# Patient Record
Sex: FEMALE | Race: WHITE | NOT HISPANIC OR LATINO | Employment: FULL TIME | URBAN - METROPOLITAN AREA
[De-identification: names, ages, dates, MRNs, and addresses within clinical notes are randomized per-mention and may not be internally consistent; named-entity substitution may affect disease eponyms.]

---

## 2019-07-03 ENCOUNTER — NON-PROVIDER VISIT (OUTPATIENT)
Dept: URGENT CARE | Facility: CLINIC | Age: 24
End: 2019-07-03

## 2019-07-03 DIAGNOSIS — Z02.1 PRE-EMPLOYMENT DRUG SCREENING: ICD-10-CM

## 2019-07-03 LAB
AMP AMPHETAMINE: NORMAL
BAR BARBITURATES: NORMAL
BZO BENZODIAZEPINES: NORMAL
COC COCAINE: NORMAL
INT CON NEG: NORMAL
INT CON POS: NORMAL
MDMA ECSTASY: NORMAL
MET METHAMPHETAMINES: NORMAL
MTD METHADONE: NORMAL
OPI OPIATES: NORMAL
OXY OXYCODONE: NORMAL
PCP PHENCYCLIDINE: NORMAL
POC URINE DRUG SCREEN OCDRS: NEGATIVE
THC: NORMAL

## 2019-07-03 PROCEDURE — 80305 DRUG TEST PRSMV DIR OPT OBS: CPT | Performed by: PHYSICIAN ASSISTANT

## 2021-08-07 ENCOUNTER — HOSPITAL ENCOUNTER (EMERGENCY)
Facility: MEDICAL CENTER | Age: 26
End: 2021-08-08
Attending: EMERGENCY MEDICINE
Payer: COMMERCIAL

## 2021-08-07 VITALS
WEIGHT: 193.34 LBS | TEMPERATURE: 98.3 F | HEIGHT: 67 IN | OXYGEN SATURATION: 95 % | DIASTOLIC BLOOD PRESSURE: 74 MMHG | HEART RATE: 82 BPM | BODY MASS INDEX: 30.35 KG/M2 | RESPIRATION RATE: 18 BRPM | SYSTOLIC BLOOD PRESSURE: 132 MMHG

## 2021-08-07 DIAGNOSIS — S16.1XXA STRAIN OF NECK MUSCLE, INITIAL ENCOUNTER: ICD-10-CM
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
## 2021-08-07 PROCEDURE — 96372 THER/PROPH/DIAG INJ SC/IM: CPT

## 2021-08-07 PROCEDURE — 700111 HCHG RX REV CODE 636 W/ 250 OVERRIDE (IP): Performed by: EMERGENCY MEDICINE

## 2021-08-07 PROCEDURE — 99285 EMERGENCY DEPT VISIT HI MDM: CPT

## 2021-08-07 RX ORDER — LISINOPRIL 40 MG/1
40 TABLET ORAL DAILY
COMMUNITY

## 2021-08-07 RX ORDER — TRAMADOL HYDROCHLORIDE 50 MG/1
50 TABLET ORAL DAILY
COMMUNITY

## 2021-08-07 RX ORDER — HYDROMORPHONE HYDROCHLORIDE 1 MG/ML
1 INJECTION, SOLUTION INTRAMUSCULAR; INTRAVENOUS; SUBCUTANEOUS ONCE
Status: COMPLETED | OUTPATIENT
Start: 2021-08-08 | End: 2021-08-07

## 2021-08-07 RX ORDER — GABAPENTIN 300 MG/1
300 CAPSULE ORAL DAILY
COMMUNITY

## 2021-08-07 RX ORDER — BUPROPION HYDROCHLORIDE 100 MG/1
100 TABLET ORAL DAILY
Status: SHIPPED | COMMUNITY
End: 2023-06-20

## 2021-08-07 RX ADMIN — HYDROMORPHONE HYDROCHLORIDE 1 MG: 1 INJECTION, SOLUTION INTRAMUSCULAR; INTRAVENOUS; SUBCUTANEOUS at 23:36

## 2021-08-07 NOTE — Clinical Note
Alexia Brown was seen and treated in our emergency department on 8/7/2021.  She may return to work on 08/10/2021.       If you have any questions or concerns, please don't hesitate to call.      Volodymyr Arteaga M.D.

## 2021-08-08 ENCOUNTER — APPOINTMENT (OUTPATIENT)
Dept: RADIOLOGY | Facility: MEDICAL CENTER | Age: 26
End: 2021-08-08
Attending: EMERGENCY MEDICINE
Payer: COMMERCIAL

## 2021-08-08 PROCEDURE — 700111 HCHG RX REV CODE 636 W/ 250 OVERRIDE (IP): Performed by: EMERGENCY MEDICINE

## 2021-08-08 PROCEDURE — 72125 CT NECK SPINE W/O DYE: CPT

## 2021-08-08 PROCEDURE — A9270 NON-COVERED ITEM OR SERVICE: HCPCS | Performed by: EMERGENCY MEDICINE

## 2021-08-08 PROCEDURE — 700102 HCHG RX REV CODE 250 W/ 637 OVERRIDE(OP): Performed by: EMERGENCY MEDICINE

## 2021-08-08 PROCEDURE — 71045 X-RAY EXAM CHEST 1 VIEW: CPT

## 2021-08-08 RX ORDER — IBUPROFEN 600 MG/1
600 TABLET ORAL ONCE
Status: COMPLETED | OUTPATIENT
Start: 2021-08-08 | End: 2021-08-08

## 2021-08-08 RX ORDER — ONDANSETRON 4 MG/1
4 TABLET, ORALLY DISINTEGRATING ORAL ONCE
Status: COMPLETED | OUTPATIENT
Start: 2021-08-08 | End: 2021-08-08

## 2021-08-08 RX ADMIN — IBUPROFEN 600 MG: 600 TABLET ORAL at 03:27

## 2021-08-08 RX ADMIN — ONDANSETRON 4 MG: 4 TABLET, ORALLY DISINTEGRATING ORAL at 00:33
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
## 2023-05-21 ENCOUNTER — APPOINTMENT (OUTPATIENT)
Dept: RADIOLOGY | Facility: MEDICAL CENTER | Age: 28
End: 2023-05-21
Attending: EMERGENCY MEDICINE
Payer: COMMERCIAL

## 2023-05-21 ENCOUNTER — HOSPITAL ENCOUNTER (EMERGENCY)
Facility: MEDICAL CENTER | Age: 28
End: 2023-05-21
Attending: EMERGENCY MEDICINE
Payer: COMMERCIAL

## 2023-05-21 VITALS
SYSTOLIC BLOOD PRESSURE: 137 MMHG | TEMPERATURE: 98.2 F | DIASTOLIC BLOOD PRESSURE: 71 MMHG | WEIGHT: 207.89 LBS | BODY MASS INDEX: 32.56 KG/M2 | OXYGEN SATURATION: 95 % | HEART RATE: 72 BPM | RESPIRATION RATE: 18 BRPM

## 2023-05-21 DIAGNOSIS — K62.5 RECTAL BLEEDING: ICD-10-CM

## 2023-05-21 LAB
ABO GROUP BLD: NORMAL
ALBUMIN SERPL BCP-MCNC: 3.9 G/DL (ref 3.2–4.9)
ALBUMIN/GLOB SERPL: 1.4 G/DL
ALP SERPL-CCNC: 191 U/L (ref 30–99)
ALT SERPL-CCNC: 79 U/L (ref 2–50)
ANION GAP SERPL CALC-SCNC: 11 MMOL/L (ref 7–16)
APTT PPP: 27.9 SEC (ref 24.7–36)
AST SERPL-CCNC: 31 U/L (ref 12–45)
BASOPHILS # BLD AUTO: 0.7 % (ref 0–1.8)
BASOPHILS # BLD: 0.05 K/UL (ref 0–0.12)
BILIRUB SERPL-MCNC: 0.2 MG/DL (ref 0.1–1.5)
BLD GP AB SCN SERPL QL: NORMAL
BUN SERPL-MCNC: 4 MG/DL (ref 8–22)
CALCIUM ALBUM COR SERPL-MCNC: 9 MG/DL (ref 8.5–10.5)
CALCIUM SERPL-MCNC: 8.9 MG/DL (ref 8.5–10.5)
CHLORIDE SERPL-SCNC: 106 MMOL/L (ref 96–112)
CO2 SERPL-SCNC: 23 MMOL/L (ref 20–33)
CREAT SERPL-MCNC: 0.6 MG/DL (ref 0.5–1.4)
EOSINOPHIL # BLD AUTO: 0.07 K/UL (ref 0–0.51)
EOSINOPHIL NFR BLD: 1 % (ref 0–6.9)
ERYTHROCYTE [DISTWIDTH] IN BLOOD BY AUTOMATED COUNT: 43.3 FL (ref 35.9–50)
GFR SERPLBLD CREATININE-BSD FMLA CKD-EPI: 125 ML/MIN/1.73 M 2
GLOBULIN SER CALC-MCNC: 2.7 G/DL (ref 1.9–3.5)
GLUCOSE SERPL-MCNC: 116 MG/DL (ref 65–99)
HCG SERPL QL: NEGATIVE
HCT VFR BLD AUTO: 42.9 % (ref 37–47)
HGB BLD-MCNC: 14 G/DL (ref 12–16)
IMM GRANULOCYTES # BLD AUTO: 0.02 K/UL (ref 0–0.11)
IMM GRANULOCYTES NFR BLD AUTO: 0.3 % (ref 0–0.9)
INR PPP: 0.97 (ref 0.87–1.13)
LIPASE SERPL-CCNC: 36 U/L (ref 11–82)
LYMPHOCYTES # BLD AUTO: 2.09 K/UL (ref 1–4.8)
LYMPHOCYTES NFR BLD: 30.6 % (ref 22–41)
MCH RBC QN AUTO: 30.2 PG (ref 27–33)
MCHC RBC AUTO-ENTMCNC: 32.6 G/DL (ref 33.6–35)
MCV RBC AUTO: 92.5 FL (ref 81.4–97.8)
MONOCYTES # BLD AUTO: 0.45 K/UL (ref 0–0.85)
MONOCYTES NFR BLD AUTO: 6.6 % (ref 0–13.4)
NEUTROPHILS # BLD AUTO: 4.14 K/UL (ref 2–7.15)
NEUTROPHILS NFR BLD: 60.8 % (ref 44–72)
NRBC # BLD AUTO: 0 K/UL
NRBC BLD-RTO: 0 /100 WBC
PLATELET # BLD AUTO: 380 K/UL (ref 164–446)
PMV BLD AUTO: 10.2 FL (ref 9–12.9)
POTASSIUM SERPL-SCNC: 3.6 MMOL/L (ref 3.6–5.5)
PROT SERPL-MCNC: 6.6 G/DL (ref 6–8.2)
PROTHROMBIN TIME: 12.8 SEC (ref 12–14.6)
RBC # BLD AUTO: 4.64 M/UL (ref 4.2–5.4)
RH BLD: NORMAL
SODIUM SERPL-SCNC: 140 MMOL/L (ref 135–145)
WBC # BLD AUTO: 6.8 K/UL (ref 4.8–10.8)

## 2023-05-21 PROCEDURE — 83690 ASSAY OF LIPASE: CPT

## 2023-05-21 PROCEDURE — 86850 RBC ANTIBODY SCREEN: CPT

## 2023-05-21 PROCEDURE — 99283 EMERGENCY DEPT VISIT LOW MDM: CPT

## 2023-05-21 PROCEDURE — 86901 BLOOD TYPING SEROLOGIC RH(D): CPT

## 2023-05-21 PROCEDURE — 71045 X-RAY EXAM CHEST 1 VIEW: CPT

## 2023-05-21 PROCEDURE — 85730 THROMBOPLASTIN TIME PARTIAL: CPT

## 2023-05-21 PROCEDURE — 80053 COMPREHEN METABOLIC PANEL: CPT

## 2023-05-21 PROCEDURE — 85025 COMPLETE CBC W/AUTO DIFF WBC: CPT

## 2023-05-21 PROCEDURE — 36415 COLL VENOUS BLD VENIPUNCTURE: CPT

## 2023-05-21 PROCEDURE — 86900 BLOOD TYPING SEROLOGIC ABO: CPT

## 2023-05-21 PROCEDURE — 84703 CHORIONIC GONADOTROPIN ASSAY: CPT

## 2023-05-21 PROCEDURE — 85610 PROTHROMBIN TIME: CPT

## 2023-05-21 ASSESSMENT — FIBROSIS 4 INDEX: FIB4 SCORE: 0.27

## 2023-05-21 ASSESSMENT — PAIN DESCRIPTION - PAIN TYPE: TYPE: ACUTE PAIN

## 2023-05-21 NOTE — Clinical Note
Alexia Brown was seen and treated in our emergency department on 5/21/2023.  She may return to work on 05/24/2023.       If you have any questions or concerns, please don't hesitate to call.      Justice Baeza D.O.

## 2023-05-21 NOTE — ED NOTES
Taken patient from triage waiting room, ambulatory with steady gait, alert/ oriented x 4.Verified patient identification.  Assumed patient care. Placed on patient room. Changed clothes to hospital gown. Connected to cardiac monitor. Given the call light and instructed to call for any assistance needed/ or concerns. Bed on lowest position, side rails up, breaks locked. Will continue to monitor for any complications     Chief Complaint   Patient presents with    Bloody Stools

## 2023-05-21 NOTE — ED TRIAGE NOTES
"Chief Complaint   Patient presents with    Bloody Stools     \"Cant stop shitting blood.\"  Pt reports recent UTI and COVID.\"  Pt reports that she was seen at [facility] recently for same.   /84   Pulse 71   Temp 36.8 °C (98.2 °F) (Temporal)   Resp 18   Wt 94.3 kg (207 lb 14.3 oz)   SpO2 96%   Pt informed of wait times. Educated on triage process.  Asked to return to triage RN for any new or worsening of symptoms. Thanked for patience.      "

## 2023-05-22 NOTE — ED NOTES
Pt discharged to home. Discharge paperwork provided. Education provided by ERP.  Pt was given follow up instructions   Pt verbalized understanding of all instructions for discharge. Patient ambulatory, alert and oriented x 4, out of ER with all belongings and steady gait.

## 2023-06-20 ENCOUNTER — OFFICE VISIT (OUTPATIENT)
Dept: MEDICAL GROUP | Facility: PHYSICIAN GROUP | Age: 28
End: 2023-06-20
Payer: COMMERCIAL

## 2023-06-20 VITALS
DIASTOLIC BLOOD PRESSURE: 74 MMHG | HEIGHT: 66 IN | SYSTOLIC BLOOD PRESSURE: 138 MMHG | HEART RATE: 74 BPM | BODY MASS INDEX: 33.87 KG/M2 | TEMPERATURE: 97.1 F | OXYGEN SATURATION: 94 % | RESPIRATION RATE: 12 BRPM | WEIGHT: 210.76 LBS

## 2023-06-20 DIAGNOSIS — E66.09 CLASS 1 OBESITY DUE TO EXCESS CALORIES WITH SERIOUS COMORBIDITY AND BODY MASS INDEX (BMI) OF 34.0 TO 34.9 IN ADULT: ICD-10-CM

## 2023-06-20 DIAGNOSIS — R74.8 ELEVATED LIVER ENZYMES: ICD-10-CM

## 2023-06-20 DIAGNOSIS — K62.5 RECTAL BLEEDING: ICD-10-CM

## 2023-06-20 DIAGNOSIS — Z86.39 HX OF THYROID NODULE: ICD-10-CM

## 2023-06-20 DIAGNOSIS — R06.83 LOUD SNORING: ICD-10-CM

## 2023-06-20 DIAGNOSIS — E66.9 OBESITY (BMI 30-39.9): ICD-10-CM

## 2023-06-20 DIAGNOSIS — M54.2 NECK PAIN: ICD-10-CM

## 2023-06-20 DIAGNOSIS — I10 PRIMARY HYPERTENSION: ICD-10-CM

## 2023-06-20 PROCEDURE — 3075F SYST BP GE 130 - 139MM HG: CPT | Performed by: STUDENT IN AN ORGANIZED HEALTH CARE EDUCATION/TRAINING PROGRAM

## 2023-06-20 PROCEDURE — 99203 OFFICE O/P NEW LOW 30 MIN: CPT | Performed by: STUDENT IN AN ORGANIZED HEALTH CARE EDUCATION/TRAINING PROGRAM

## 2023-06-20 PROCEDURE — 3078F DIAST BP <80 MM HG: CPT | Performed by: STUDENT IN AN ORGANIZED HEALTH CARE EDUCATION/TRAINING PROGRAM

## 2023-06-20 RX ORDER — BUPROPION HYDROCHLORIDE 150 MG/1
TABLET, EXTENDED RELEASE ORAL
COMMUNITY
Start: 2022-12-23

## 2023-06-20 ASSESSMENT — FIBROSIS 4 INDEX: FIB4 SCORE: 0.25

## 2023-06-20 ASSESSMENT — ENCOUNTER SYMPTOMS
SHORTNESS OF BREATH: 0
HEADACHES: 0
COUGH: 0
ORTHOPNEA: 0
PALPITATIONS: 0
FOCAL WEAKNESS: 0
NAUSEA: 0
BLOOD IN STOOL: 1
CHILLS: 0
WHEEZING: 0
ABDOMINAL PAIN: 1
DIZZINESS: 0
FEVER: 0

## 2023-06-20 ASSESSMENT — PATIENT HEALTH QUESTIONNAIRE - PHQ9: CLINICAL INTERPRETATION OF PHQ2 SCORE: 0

## 2023-06-20 NOTE — ASSESSMENT & PLAN NOTE
History of high blood pressure, currently on lisinopril 40 mg daily.  Reports her average blood pressures at home are around 140 systolic.  But she believes it is due to the recent COVID and other medical issues she is currently having.  Advised her to continue lisinopril 40 mg daily, check blood pressures daily, continue to lose weight exercise and diet.  Return to care 3 months for recheck

## 2023-06-20 NOTE — ASSESSMENT & PLAN NOTE
Significantly elevated liver enzymes last month, I suspect this was from her COVID infection.  We will recheck these values

## 2023-06-20 NOTE — LETTER
[employer] [facility]  Keily Pablo D.O.  [address]  [address]  [facility] [address]  Fax: [phone]   Authorization for Release/Disclosure of   Protected Health Information   Name: ALEXIA BROWN : 1995 SSN: [id number]   Address: [address] [place] [id number] Phone:    [phone] (home)    I authorize the entity listed below to release/disclose the PHI below to:   [facility]/Keily Pablo D.O. and Keily Pablo D.O.   Provider or Entity Name:  [facility]   Address   City, State, Zip   Phone:      Fax:     Reason for request: continuity of care   Information to be released:    [  ] LAST COLONOSCOPY,  including any PATH REPORT and follow-up  [  ] LAST FIT/COLOGUARD RESULT [  ] LAST DEXA  [  ] LAST MAMMOGRAM  [  ] LAST PAP  [  ] LAST LABS [  ] RETINA EXAM REPORT  [  ] IMMUNIZATION RECORDS  [x  ] Release all info      [  ] Check here and initial the line next to each item to release ALL health information INCLUDING  _____ Care and treatment for drug and / or alcohol abuse  _____ HIV testing, infection status, or AIDS  _____ Genetic Testing    DATES OF SERVICE OR TIME PERIOD TO BE DISCLOSED: _____________  I understand and acknowledge that:  * This Authorization may be revoked at any time by you in writing, except if your health information has already been used or disclosed.  * Your health information that will be used or disclosed as a result of you signing this authorization could be re-disclosed by the recipient. If this occurs, your re-disclosed health information may no longer be protected by State or Federal laws.  * You may refuse to sign this Authorization. Your refusal will not affect your ability to obtain treatment.  * This Authorization becomes effective upon signing and will  on (date) __________.      If no date is indicated, this Authorization will  one (1) year from the signature date.    Name: Alexia Brown  Signature: Date:    6/20/2023     PLEASE FAX REQUESTED RECORDS BACK TO: [phone]

## 2023-06-20 NOTE — PROGRESS NOTES
Subjective:   HISTORY OF THE PRESENT ILLNESS: Patient is a 27 y.o. female here today to establish care.     Problem   Rectal Bleeding    Patient reports that she had episodes of rectal bleeding last month she went to the ER twice.  Reports that she may have hemorrhoids however she had referral placed to gastroenterology want to move forward with a colonoscopy.  She has this scheduled.  CBC from last month shows no anemia     Obesity (Bmi 30-39.9)   Obesity Due to Excess Calories With Serious Comorbidity   Elevated Liver Enzymes   Hypertension    Has htn    On lisinopril 40mg daily.   At home blood pressures are occasional high, on average 140 systolic.      Neck Pain        Current Outpatient Medications Ordered in Epic   Medication Sig Dispense Refill    buPROPion SR (WELLBUTRIN-SR) 150 MG TABLET SR 12 HR sustained-release tablet TAKE ONE TABLETS BY MOUTH EVERY MORNING      traMADol (ULTRAM) 50 MG Tab Take 50 mg by mouth every day.      lisinopril (PRINIVIL) 40 MG tablet Take 40 mg by mouth every day.      gabapentin (NEURONTIN) 300 MG Cap Take 300 mg by mouth every day.       No current [facility]-ordered facility-administered medications on file.       Review of systems:  Review of Systems   Constitutional:  Negative for chills and fever.   Respiratory:  Negative for cough, shortness of breath and wheezing.    Cardiovascular:  Negative for chest pain, palpitations, orthopnea and leg swelling.   Gastrointestinal:  Positive for abdominal pain and blood in stool. Negative for melena and nausea.   Musculoskeletal:  Negative for joint pain.   Neurological:  Negative for dizziness, focal weakness and headaches.         Past Medical History:   Diagnosis Date    Depression     Hypertension     Neck pain      Past Surgical History:   Procedure Laterality Date    CERVICAL DISK AND FUSION ANTERIOR      LAPAROSCOPY      abdomen      Social History     Tobacco Use    Smoking status: Former     Types: Cigarettes     Quit date:  "2022     Years since quittin.4    Smokeless tobacco: Never   Vaping Use    Vaping Use: Never used   Substance Use Topics    Alcohol use: Not Currently    Drug use: Yes     Types: Oral     Comment: gummie marijuana       Family History   Problem Relation Age of Onset    Breast Cancer Maternal Grandmother      Current Outpatient Medications   Medication Sig Dispense Refill    buPROPion SR (WELLBUTRIN-SR) 150 MG TABLET SR 12 HR sustained-release tablet TAKE ONE TABLETS BY MOUTH EVERY MORNING      traMADol (ULTRAM) 50 MG Tab Take 50 mg by mouth every day.      lisinopril (PRINIVIL) 40 MG tablet Take 40 mg by mouth every day.      gabapentin (NEURONTIN) 300 MG Cap Take 300 mg by mouth every day.       No current facility-administered medications for this visit.       Allergies:  No Known Allergies    Allergies, past medical history, past surgical history, family history, social history reviewed and updated.    Objective:    /74 (BP Location: Right arm, Patient Position: Sitting, BP Cuff Size: Adult)   Pulse 74   Temp 36.2 °C (97.1 °F) (Temporal)   Resp 12   Ht 1.676 m (5' 6\")   Wt 95.6 kg (210 lb 12.2 oz)   LMP 2023 (Exact Date)   SpO2 94%   BMI 34.02 kg/m²    Body mass index is 34.02 kg/m².    Physical exam:  General: Normal appearance, no acute distress, not ill-appearing  HEENT: EOM intact, conjunctiva normal limits, negative right/left eye discharge.  Sclera anicteric  Cardiovascular: Normal rate and rhythm, no murmurs  Pulmonary: No respiratory distress, no wheezing, no rales, breath sounds normal.  Abdomen: Bowel sounds normal, flat, soft.  Musculoskeletal: No edema bilaterally  Skin: Warm, dry, no lesions  Neurological: No focal deficits, normal gait  Psychiatric: Mood within normal limits    Assessment/Plan:    Patient here for a preventive medicine visit today and to establish care.  -Reviewed all past medical history, family history, social history    -Diet and exercise appropriate " counseling given  -Social determinants of health reviewed  -Tobacco, alcohol, recreational drug use: no concerns  -Occupation: beautician  -Cholesterol screening: not indicated  -Diabetes screening: ordered      Immunizations/Infectious disease:  STI screening:declines  Safe sex practices discusssed  HIV screening: declines  Immunizations: up to date    Cancer screenings:  Colorectal cancer screening: no fam hx   Cervical Cancer Screening: advised to schedule  Breast Cancer Screening:          ----BRCA SCREENING: FHS-7 score: maternal grandmother with breast cancer. Information to [employer] given      Ob-Gyn/ History:   /Para:    Gyn Surgery: laparoscopy for endometriosis.   Current Contraceptive Method: copper IUD placed 2 years, cycles are irregular.       Problem List Items Addressed This Visit       Rectal bleeding     Following up with GI, has colonoscopy scheduled         Obesity (BMI 30-39.9)    Relevant Orders    TSH+FREE T4    HEMOGLOBIN A1C    Referral to Nutrition Services    Obesity due to excess calories with serious comorbidity    Hypertension     History of high blood pressure, currently on lisinopril 40 mg daily.  Reports her average blood pressures at home are around 140 systolic.  But she believes it is due to the recent COVID and other medical issues she is currently having.  Advised her to continue lisinopril 40 mg daily, check blood pressures daily, continue to lose weight exercise and diet.  Return to care 3 months for recheck         Neck pain    Elevated liver enzymes     Significantly elevated liver enzymes last month, I suspect this was from her COVID infection.  We will recheck these values         Relevant Orders    Comp Metabolic Panel     Other Visit Diagnoses       Loud snoring        Relevant Orders    Referral to Pulmonary and Sleep Medicine    Hx of thyroid nodule        Relevant Orders    US-THYROID             Patient Counseling:  Return in about 3 months  (around 9/20/2023) for symptoms.

## 2023-07-21 ENCOUNTER — APPOINTMENT (OUTPATIENT)
Dept: RADIOLOGY | Facility: MEDICAL CENTER | Age: 28
End: 2023-07-21
Attending: STUDENT IN AN ORGANIZED HEALTH CARE EDUCATION/TRAINING PROGRAM
Payer: COMMERCIAL

## 2023-07-21 ENCOUNTER — HOSPITAL ENCOUNTER (OUTPATIENT)
Dept: LAB | Facility: MEDICAL CENTER | Age: 28
End: 2023-07-21
Attending: STUDENT IN AN ORGANIZED HEALTH CARE EDUCATION/TRAINING PROGRAM
Payer: COMMERCIAL

## 2023-07-21 DIAGNOSIS — Z86.39 HX OF THYROID NODULE: ICD-10-CM

## 2023-07-21 DIAGNOSIS — R74.8 ELEVATED LIVER ENZYMES: ICD-10-CM

## 2023-07-21 DIAGNOSIS — E66.9 OBESITY (BMI 30-39.9): ICD-10-CM

## 2023-07-21 LAB
ALBUMIN SERPL BCP-MCNC: 4.1 G/DL (ref 3.2–4.9)
ALBUMIN/GLOB SERPL: 1.4 G/DL
ALP SERPL-CCNC: 116 U/L (ref 30–99)
ALT SERPL-CCNC: 13 U/L (ref 2–50)
ANION GAP SERPL CALC-SCNC: 8 MMOL/L (ref 7–16)
AST SERPL-CCNC: 17 U/L (ref 12–45)
BILIRUB SERPL-MCNC: 0.2 MG/DL (ref 0.1–1.5)
BUN SERPL-MCNC: 8 MG/DL (ref 8–22)
CALCIUM ALBUM COR SERPL-MCNC: 9.1 MG/DL (ref 8.5–10.5)
CALCIUM SERPL-MCNC: 9.2 MG/DL (ref 8.4–10.2)
CHLORIDE SERPL-SCNC: 104 MMOL/L (ref 96–112)
CO2 SERPL-SCNC: 27 MMOL/L (ref 20–33)
CREAT SERPL-MCNC: 0.76 MG/DL (ref 0.5–1.4)
EST. AVERAGE GLUCOSE BLD GHB EST-MCNC: 123 MG/DL
FASTING STATUS PATIENT QL REPORTED: NORMAL
GFR SERPLBLD CREATININE-BSD FMLA CKD-EPI: 109 ML/MIN/1.73 M 2
GLOBULIN SER CALC-MCNC: 3 G/DL (ref 1.9–3.5)
GLUCOSE SERPL-MCNC: 89 MG/DL (ref 65–99)
HBA1C MFR BLD: 5.9 % (ref 4–5.6)
POTASSIUM SERPL-SCNC: 4.3 MMOL/L (ref 3.6–5.5)
PROT SERPL-MCNC: 7.1 G/DL (ref 6–8.2)
SODIUM SERPL-SCNC: 139 MMOL/L (ref 135–145)
T4 FREE SERPL-MCNC: 1.19 NG/DL (ref 0.93–1.7)
TSH SERPL DL<=0.005 MIU/L-ACNC: 1.61 UIU/ML (ref 0.38–5.33)

## 2023-07-21 PROCEDURE — 84439 ASSAY OF FREE THYROXINE: CPT

## 2023-07-21 PROCEDURE — 84443 ASSAY THYROID STIM HORMONE: CPT

## 2023-07-21 PROCEDURE — 83036 HEMOGLOBIN GLYCOSYLATED A1C: CPT

## 2023-07-21 PROCEDURE — 76536 US EXAM OF HEAD AND NECK: CPT

## 2023-07-21 PROCEDURE — 80053 COMPREHEN METABOLIC PANEL: CPT

## 2023-07-21 PROCEDURE — 36415 COLL VENOUS BLD VENIPUNCTURE: CPT

## 2023-08-15 RX ORDER — ALBUTEROL SULFATE 90 UG/1
AEROSOL, METERED RESPIRATORY (INHALATION)
COMMUNITY

## 2023-08-17 ENCOUNTER — OFFICE VISIT (OUTPATIENT)
Dept: MEDICAL GROUP | Facility: IMAGING CENTER | Age: 28
End: 2023-08-17
Payer: COMMERCIAL

## 2023-08-17 VITALS
OXYGEN SATURATION: 96 % | HEART RATE: 88 BPM | DIASTOLIC BLOOD PRESSURE: 76 MMHG | TEMPERATURE: 97.6 F | HEIGHT: 67 IN | BODY MASS INDEX: 32.96 KG/M2 | SYSTOLIC BLOOD PRESSURE: 118 MMHG | WEIGHT: 210 LBS | RESPIRATION RATE: 16 BRPM

## 2023-08-17 DIAGNOSIS — Z82.69 FAMILY HISTORY OF SYSTEMIC LUPUS ERYTHEMATOSUS (SLE) IN MOTHER: ICD-10-CM

## 2023-08-17 DIAGNOSIS — E66.09 CLASS 1 OBESITY DUE TO EXCESS CALORIES WITH SERIOUS COMORBIDITY AND BODY MASS INDEX (BMI) OF 33.0 TO 33.9 IN ADULT: ICD-10-CM

## 2023-08-17 DIAGNOSIS — Z13.220 SCREENING CHOLESTEROL LEVEL: ICD-10-CM

## 2023-08-17 DIAGNOSIS — R59.0 CERVICAL LYMPHADENOPATHY: ICD-10-CM

## 2023-08-17 DIAGNOSIS — I10 PRIMARY HYPERTENSION: ICD-10-CM

## 2023-08-17 DIAGNOSIS — R73.02 IGT (IMPAIRED GLUCOSE TOLERANCE): ICD-10-CM

## 2023-08-17 DIAGNOSIS — Z11.59 NEED FOR HEPATITIS C SCREENING TEST: ICD-10-CM

## 2023-08-17 DIAGNOSIS — E04.2 MULTIPLE THYROID NODULES: ICD-10-CM

## 2023-08-17 DIAGNOSIS — Z97.5 IUD (INTRAUTERINE DEVICE) IN PLACE: ICD-10-CM

## 2023-08-17 DIAGNOSIS — R53.83 FATIGUE, UNSPECIFIED TYPE: ICD-10-CM

## 2023-08-17 PROBLEM — E66.811 CLASS 1 OBESITY DUE TO EXCESS CALORIES WITH SERIOUS COMORBIDITY AND BODY MASS INDEX (BMI) OF 33.0 TO 33.9 IN ADULT: Status: ACTIVE | Noted: 2023-06-20

## 2023-08-17 PROBLEM — R74.8 ELEVATED LIVER ENZYMES: Status: RESOLVED | Noted: 2023-06-20 | Resolved: 2023-08-17

## 2023-08-17 PROCEDURE — 99214 OFFICE O/P EST MOD 30 MIN: CPT | Performed by: PHYSICIAN ASSISTANT

## 2023-08-17 PROCEDURE — 3078F DIAST BP <80 MM HG: CPT | Performed by: PHYSICIAN ASSISTANT

## 2023-08-17 PROCEDURE — 1126F AMNT PAIN NOTED NONE PRSNT: CPT | Performed by: PHYSICIAN ASSISTANT

## 2023-08-17 PROCEDURE — 3074F SYST BP LT 130 MM HG: CPT | Performed by: PHYSICIAN ASSISTANT

## 2023-08-17 ASSESSMENT — FIBROSIS 4 INDEX: FIB4 SCORE: 0.35

## 2023-08-17 ASSESSMENT — PAIN SCALES - GENERAL: PAINLEVEL: NO PAIN

## 2023-08-17 NOTE — ASSESSMENT & PLAN NOTE
Patient was to chronic fatigue since her COVID infection in May 2023.  At the time she was found to have elevated liver function test.  She did have a thyroid ultrasound last month showed multiple cervical lymph nodes.  She states that she gets brain fog and occasional outbreaks of hives, which she has related to anxiety in the past.

## 2023-08-17 NOTE — PATIENT INSTRUCTIONS
Please continue working on lifestyle modifications.  There are 4 types of exercise that are recommended.  Endurance training includes 150 minutes or more of moderate-intensity activity each week as tolerated.  It is also recommended to incorporate exercises to help with flexibility and balance twice per week, and weight/resistance training twice per week.  The benefits of weight training include increased strength of the bones, muscles, and connective tissues which ultimately helps to stabilize your joints, lower risk of injury, decrease fall risk, improve your metabolism, and improve quality of life long-term.  If you are weightlifting twice a week, one day can be focus more on your lower body and the other on your upper body.  If you need assistance with a weight training program, please consult with a certified  or schedule a consultation in my office for guidance.    Additionally, please follow a healthy diet that is low in saturated fat, sodium, and cholesterol, such as the mediterranean diet.  Please focus on a diet that is high in fruits, vegetables, whole grains, beans, nuts, and seeds, includes olive oil as an important source of monounsaturated fat. You may also consider a plant-based diet.  Please also increase your dietary fiber intake to 25 to 30 g a day and limit added sugar to <30 g/day.      In regards to alcohol intake, the 2020 to 2025 Dietary Guidelines for Americans advise no more than two drinks per day for males and one drink per day for nonpregnant females, although complete cessation would be ideal to reduce risks of long term health effects from alcohol.     Try to eat 6 servings of fruits and vegetables per day. Protein with every meal.     It was a pleasure meeting with you today at [facility]!    Your medical history/records and medications were reviewed today.     UPDATE on [employer] Results: If you have blood work, and/or imaging studies, or any other test or  procedure completed, you will have access to results as soon as they become available in MyChart. Recently, these results will be available for review at the same time that your provider is able to see results!    This will likely mean you will see a result before your provider has had a chance to review and discuss with you.  Some results or care notes may be hard to understand and may be serious in nature.    We look at every result and your provider will contact you to explain what they mean and discuss appropriate next steps. Please allow for at least 72 business hours for chart and result review.     We prefer that you wait for your care team to contact you with your results.  Often, your provider will discuss your results with you at your next appointment. We look forward to continuing to partner with you in your care.    Please review my practice information below:    If you have any prescription refill requests, please send them via [employer] or discuss with your provider at the start of your office visits. Please allow 3-5 business days for lab and testing review and you will be contacted via [employer] with those results, or if advised to make a follow up appointment regarding those results, then please do so.     Once resulted, your lab/test/imaging results will show up automatically in your MyChart. Please wait for my interpretation and recommendations prior to viewing your results to avoid any unnecessary confusion or misinterpretation. I will address all of the lab values that I interpret as abnormal and message you accordingly on your MyChart. I will always send you a message about your results even if they are normal. If you do not hear back from me within 5-7 business days after completing your tests, then please send me a message on MyChart so I can obtain your results (especially if you went to an outside lab or imaging center - [facility], [facility], etc).     If you have any additional questions or concerns  beyond my interpretation of your results, please make an appointment with me to discuss in further detail.    Please only use the [employer] messaging system for questions regarding your most recent appointment or if advised to use otherwise (glucose or blood pressure reporting).     If you have any new problems or concerns, you must make an appointment to discuss. This includes any referral requests, lab requests (unless advised to notify me for pre-appt labs), medication side effects, or request for medication adjustments.     Please arrive 15 minutes prior to your appointment time to complete your check-in and intake with the medical assistant.      Thank you,    Hanny Barney PA-C ([place])  Physician Assistant Certified  [facility]    -----------------------------------------------------------------    Attn: Patients of [facility]:    In an effort to continue to provide excellent and efficient care to our patients, it is vital that we continue to use our resources appropriately. With that, this is a reminder that [employer] is used for prescription refill requests, test results, virtual visits, and chart review only.     Any new questions, concerns/conditions, lab/imaging requests, medication adjustments, new prescriptions, or referral requests do require an appointment (virtually or in person), unless discussed otherwise at your most recent appointment.     Thank you for your understanding,    [facility]

## 2023-08-17 NOTE — PROGRESS NOTES
Subjective:     CC:   Chief Complaint   Patient presents with    [facility]    Weight Check    Medication Management       HPI:   Alexia presents today to discuss:    Class 1 obesity due to excess calories with serious comorbidity and body mass index (BMI) of 33.0 to 33.9 in adult  Patient admits to difficulty losing weight.  She states that she has been trying to focus on increasing her protein and cutting back on sugar.  Her most recent labs show that she has impaired glucose tolerance.  She does admit to a family history of diabetes in her father.  She does have a personal history of hypertension.    Fatigue  Patient was to chronic fatigue since her COVID infection in May 2023.  At the time she was found to have elevated liver function test.  She did have a thyroid ultrasound last month showed multiple cervical lymph nodes.  She states that she gets brain fog and occasional outbreaks of hives, which she has related to anxiety in the past.    Multiple thyroid nodules  Thyroid ultrasound 1 month ago showed multiple thyroid nodules all less than 1 cm in size.  Normal TSH and free T4.      Past Medical History:   Diagnosis Date    Depression     Hypertension     Neck pain      Family History   Problem Relation Age of Onset    Alcohol abuse Mother     Drug abuse Mother     Lupus Mother     Alcohol abuse Father     Drug abuse Father     Diabetes Father     Thyroid Father     Hypertension Sister     Cancer Maternal Aunt         cervical    Cancer Maternal Grandmother         lung cancer    Breast Cancer Maternal Grandmother     Lupus Other      Past Surgical History:   Procedure Laterality Date    CERVICAL DISK AND FUSION ANTERIOR      LAPAROSCOPY      abdomen      Social History     Tobacco Use    Smoking status: Former     Types: Cigarettes     Quit date: 2022     Years since quittin.6    Smokeless tobacco: Never   Vaping Use    Vaping Use: Never used   Substance Use Topics    Alcohol use: Not Currently  "   Drug use: Yes     Types: Oral     Comment: gummie marijuana      Social History     Social History Narrative    Not on file     Current Outpatient Medications Ordered in Epic   Medication Sig Dispense Refill    PARAGARD INTRAUTERINE COPPER IU by Intrauterine route.      albuterol 108 (90 Base) MCG/ACT Aero Soln inhalation aerosol Inhale.      buPROPion SR (WELLBUTRIN-SR) 150 MG TABLET SR 12 HR sustained-release tablet TAKE ONE TABLETS BY MOUTH EVERY MORNING      traMADol (ULTRAM) 50 MG Tab Take 50 mg by mouth every day.      lisinopril (PRINIVIL) 40 MG tablet Take 40 mg by mouth every day.      gabapentin (NEURONTIN) 300 MG Cap Take 300 mg by mouth every day.       No current [facility]-ordered facility-administered medications on file.     Patient has no known allergies.    PMH/PSH/FH/Social history reviewed.  Vaccinations discussed.  Previous records and labs reviewed. Discussed age appropriate anticipatory guidance.    ROS: see hpi  Gen: no fevers/chills  Pulm: no sob, no cough  CV: no chest pain, no palpitations, no edema  GI: no nausea/vomiting, no diarrhea  Skin: no rash    Objective:   Exam:  /76 (BP Location: Left arm, Patient Position: Sitting, BP Cuff Size: Adult)   Pulse 88   Temp 36.4 °C (97.6 °F) (Temporal)   Resp 16   Ht 1.689 m (5' 6.5\")   Wt 95.3 kg (210 lb)   SpO2 96%   BMI 33.39 kg/m²    Body mass index is 33.39 kg/m².    Gen: Alert and oriented, No apparent distress.  HEENT: Head atraumatic, normocephalic. Pupils equal and round.  Neck: Neck is supple. B/L thyroid nodules palpated without tenderness.  Bilateral less than 1 cm anterior cervical lymph nodes palpated, freely movable, nontender.  Lungs: Normal effort, CTA bilaterally, no wheezes, rhonchi, or rales  CV: Regular rate and rhythm. No murmurs, rubs, or gallops.  Ext: No clubbing, cyanosis, edema.    Assessment & Plan:     28 y.o. female with the following -     1. Class 1 obesity due to excess calories with serious comorbidity " and body mass index (BMI) of 33.0 to 33.9 in adult  Please continue working on lifestyle modifications.  There are 4 types of exercise that are recommended.  Endurance training includes 150 minutes or more of moderate-intensity activity each week as tolerated.  It is also recommended to incorporate exercises to help with flexibility and balance twice per week, and weight/resistance training twice per week.  The benefits of weight training include increased strength of the bones, muscles, and connective tissues which ultimately helps to stabilize your joints, lower risk of injury, decrease fall risk, improve your metabolism, and improve quality of life long-term.  If you are weightlifting twice a week, one day can be focus more on your lower body and the other on your upper body.  If you need assistance with a weight training program, please consult with a certified  or schedule a consultation in my office for guidance.    Additionally, please follow a healthy diet that is low in saturated fat, sodium, and cholesterol, such as the mediterranean diet.  Please focus on a diet that is high in fruits, vegetables, whole grains, beans, nuts, and seeds, includes olive oil as an important source of monounsaturated fat. You may also consider a plant-based diet.  Please also increase your dietary fiber intake to 25 to 30 g a day and limit added sugar to <30 g/day.      In regards to alcohol intake, the 2020 to 2025 Dietary Guidelines for Americans advise no more than two drinks per day for males and one drink per day for nonpregnant females, although complete cessation would be ideal to reduce risks of long term health effects from alcohol.  - Lipid Profile; Future    2. Multiple thyroid nodules  - ANTI-THYROID ANTIBODIES; Future    3. Cervical lymphadenopathy  - CBC WITH DIFFERENTIAL; Future  - LDH; Future  - EBV ACUTE INFECTION AB PANEL; Future  - HÉCTOR REFLEXIVE PROFILE; Future    4. Fatigue, unspecified type  -  CORTISOL; Future  - ACTH; Future  - INSULIN FASTING; Future  - Blood Glucose; Future  - ANTI-THYROID ANTIBODIES; Future  - CBC WITH DIFFERENTIAL; Future  - EBV ACUTE INFECTION AB PANEL; Future  - HÉCTOR REFLEXIVE PROFILE; Future    5. Primary hypertension  Chronic, controlled and stable. Continue current regimen - Lisinopril 40 mg daily. Recommend DASH diet, exercise as tolerated. Monitor Blood Pressure at home and report any consistent readings above >140/90. Seek medical help/ER if chest pain, palpitations, shortness of breath, headache, dizziness.     6. IGT (impaired glucose tolerance)  - CORTISOL; Future  - ACTH; Future  - INSULIN FASTING; Future  - Blood Glucose; Future    7. Family history of systemic lupus erythematosus (SLE) in mother  - HÉCTOR REFLEXIVE PROFILE; Future    8. IUD (intrauterine device) in place  - Referral to Gynecology - needs pap    9. Need for hepatitis C screening test  - HEP C VIRUS ANTIBODY; Future    10. Screening cholesterol level  - Lipid Profile; Future    Return for Will notify patient to follow-up pending tests.    Hanny Barney PA-C ([place])  Physician Assistant Certified  [facility]    Please note that this dictation was created using voice recognition software. I have made every reasonable attempt to correct obvious errors, but I expect that there are errors of grammar and possibly content that I did not discover before finalizing the note.

## 2023-08-17 NOTE — ASSESSMENT & PLAN NOTE
Thyroid ultrasound 1 month ago showed multiple thyroid nodules all less than 1 cm in size.  Normal TSH and free T4.

## 2023-08-17 NOTE — ASSESSMENT & PLAN NOTE
Patient admits to difficulty losing weight.  She states that she has been trying to focus on increasing her protein and cutting back on sugar.  Her most recent labs show that she has impaired glucose tolerance.  She does admit to a family history of diabetes in her father.  She does have a personal history of hypertension.

## 2023-08-30 ENCOUNTER — APPOINTMENT (OUTPATIENT)
Dept: RADIOLOGY | Facility: MEDICAL CENTER | Age: 28
End: 2023-08-30
Attending: EMERGENCY MEDICINE
Payer: COMMERCIAL

## 2023-08-30 ENCOUNTER — HOSPITAL ENCOUNTER (EMERGENCY)
Facility: MEDICAL CENTER | Age: 28
End: 2023-08-30
Attending: EMERGENCY MEDICINE
Payer: COMMERCIAL

## 2023-08-30 VITALS
TEMPERATURE: 97.9 F | SYSTOLIC BLOOD PRESSURE: 122 MMHG | WEIGHT: 210.54 LBS | BODY MASS INDEX: 33.84 KG/M2 | HEART RATE: 94 BPM | HEIGHT: 66 IN | RESPIRATION RATE: 16 BRPM | OXYGEN SATURATION: 93 % | DIASTOLIC BLOOD PRESSURE: 70 MMHG

## 2023-08-30 DIAGNOSIS — R10.10 PAIN OF UPPER ABDOMEN: ICD-10-CM

## 2023-08-30 DIAGNOSIS — R11.10 VOMITING AND DIARRHEA: ICD-10-CM

## 2023-08-30 DIAGNOSIS — R19.7 VOMITING AND DIARRHEA: ICD-10-CM

## 2023-08-30 LAB
ALBUMIN SERPL BCP-MCNC: 4.5 G/DL (ref 3.2–4.9)
ALBUMIN/GLOB SERPL: 1.4 G/DL
ALP SERPL-CCNC: 130 U/L (ref 30–99)
ALT SERPL-CCNC: 29 U/L (ref 2–50)
ANION GAP SERPL CALC-SCNC: 11 MMOL/L (ref 7–16)
APPEARANCE UR: ABNORMAL
AST SERPL-CCNC: 48 U/L (ref 12–45)
BACTERIA #/AREA URNS HPF: NEGATIVE /HPF
BASOPHILS # BLD AUTO: 0.5 % (ref 0–1.8)
BASOPHILS # BLD: 0.04 K/UL (ref 0–0.12)
BILIRUB SERPL-MCNC: 0.3 MG/DL (ref 0.1–1.5)
BILIRUB UR QL STRIP.AUTO: ABNORMAL
BUN SERPL-MCNC: 10 MG/DL (ref 8–22)
CALCIUM ALBUM COR SERPL-MCNC: 8.7 MG/DL (ref 8.5–10.5)
CALCIUM SERPL-MCNC: 9.1 MG/DL (ref 8.5–10.5)
CHLORIDE SERPL-SCNC: 101 MMOL/L (ref 96–112)
CO2 SERPL-SCNC: 26 MMOL/L (ref 20–33)
COLOR UR: ABNORMAL
CREAT SERPL-MCNC: 0.76 MG/DL (ref 0.5–1.4)
EOSINOPHIL # BLD AUTO: 0.06 K/UL (ref 0–0.51)
EOSINOPHIL NFR BLD: 0.8 % (ref 0–6.9)
EPI CELLS #/AREA URNS HPF: ABNORMAL /HPF
ERYTHROCYTE [DISTWIDTH] IN BLOOD BY AUTOMATED COUNT: 42.6 FL (ref 35.9–50)
GFR SERPLBLD CREATININE-BSD FMLA CKD-EPI: 109 ML/MIN/1.73 M 2
GLOBULIN SER CALC-MCNC: 3.2 G/DL (ref 1.9–3.5)
GLUCOSE SERPL-MCNC: 99 MG/DL (ref 65–99)
GLUCOSE UR STRIP.AUTO-MCNC: NEGATIVE MG/DL
HCG SERPL QL: NEGATIVE
HCT VFR BLD AUTO: 46.6 % (ref 37–47)
HGB BLD-MCNC: 15.5 G/DL (ref 12–16)
HYALINE CASTS #/AREA URNS LPF: ABNORMAL /LPF
IMM GRANULOCYTES # BLD AUTO: 0.02 K/UL (ref 0–0.11)
IMM GRANULOCYTES NFR BLD AUTO: 0.3 % (ref 0–0.9)
KETONES UR STRIP.AUTO-MCNC: NEGATIVE MG/DL
LEUKOCYTE ESTERASE UR QL STRIP.AUTO: ABNORMAL
LIPASE SERPL-CCNC: 56 U/L (ref 11–82)
LYMPHOCYTES # BLD AUTO: 1.81 K/UL (ref 1–4.8)
LYMPHOCYTES NFR BLD: 24.3 % (ref 22–41)
MCH RBC QN AUTO: 30.2 PG (ref 27–33)
MCHC RBC AUTO-ENTMCNC: 33.3 G/DL (ref 32.2–35.5)
MCV RBC AUTO: 90.7 FL (ref 81.4–97.8)
MICRO URNS: ABNORMAL
MONOCYTES # BLD AUTO: 0.7 K/UL (ref 0–0.85)
MONOCYTES NFR BLD AUTO: 9.4 % (ref 0–13.4)
NEUTROPHILS # BLD AUTO: 4.82 K/UL (ref 1.82–7.42)
NEUTROPHILS NFR BLD: 64.7 % (ref 44–72)
NITRITE UR QL STRIP.AUTO: NEGATIVE
NRBC # BLD AUTO: 0 K/UL
NRBC BLD-RTO: 0 /100 WBC (ref 0–0.2)
PH UR STRIP.AUTO: 5 [PH] (ref 5–8)
PLATELET # BLD AUTO: 354 K/UL (ref 164–446)
PMV BLD AUTO: 9.8 FL (ref 9–12.9)
POTASSIUM SERPL-SCNC: 4.4 MMOL/L (ref 3.6–5.5)
PROT SERPL-MCNC: 7.7 G/DL (ref 6–8.2)
PROT UR QL STRIP: 100 MG/DL
RBC # BLD AUTO: 5.14 M/UL (ref 4.2–5.4)
RBC # URNS HPF: >150 /HPF
RBC UR QL AUTO: ABNORMAL
SODIUM SERPL-SCNC: 138 MMOL/L (ref 135–145)
SP GR UR STRIP.AUTO: 1.01
UROBILINOGEN UR STRIP.AUTO-MCNC: 0.2 MG/DL
WBC # BLD AUTO: 7.5 K/UL (ref 4.8–10.8)
WBC #/AREA URNS HPF: ABNORMAL /HPF

## 2023-08-30 PROCEDURE — 99285 EMERGENCY DEPT VISIT HI MDM: CPT

## 2023-08-30 PROCEDURE — 74176 CT ABD & PELVIS W/O CONTRAST: CPT

## 2023-08-30 PROCEDURE — 700111 HCHG RX REV CODE 636 W/ 250 OVERRIDE (IP): Mod: JZ | Performed by: EMERGENCY MEDICINE

## 2023-08-30 PROCEDURE — 85025 COMPLETE CBC W/AUTO DIFF WBC: CPT

## 2023-08-30 PROCEDURE — 96375 TX/PRO/DX INJ NEW DRUG ADDON: CPT

## 2023-08-30 PROCEDURE — 80053 COMPREHEN METABOLIC PANEL: CPT

## 2023-08-30 PROCEDURE — 84703 CHORIONIC GONADOTROPIN ASSAY: CPT

## 2023-08-30 PROCEDURE — 36415 COLL VENOUS BLD VENIPUNCTURE: CPT

## 2023-08-30 PROCEDURE — 700105 HCHG RX REV CODE 258: Performed by: EMERGENCY MEDICINE

## 2023-08-30 PROCEDURE — 96376 TX/PRO/DX INJ SAME DRUG ADON: CPT

## 2023-08-30 PROCEDURE — 81001 URINALYSIS AUTO W/SCOPE: CPT

## 2023-08-30 PROCEDURE — 96374 THER/PROPH/DIAG INJ IV PUSH: CPT

## 2023-08-30 PROCEDURE — 83690 ASSAY OF LIPASE: CPT

## 2023-08-30 RX ORDER — ONDANSETRON 2 MG/ML
4 INJECTION INTRAMUSCULAR; INTRAVENOUS ONCE
Status: COMPLETED | OUTPATIENT
Start: 2023-08-30 | End: 2023-08-30

## 2023-08-30 RX ORDER — ONDANSETRON 4 MG/1
4 TABLET, ORALLY DISINTEGRATING ORAL EVERY 6 HOURS PRN
Qty: 10 TABLET | Refills: 0 | Status: SHIPPED | OUTPATIENT
Start: 2023-08-30 | End: 2024-03-12

## 2023-08-30 RX ORDER — HYDROMORPHONE HYDROCHLORIDE 1 MG/ML
0.5 INJECTION, SOLUTION INTRAMUSCULAR; INTRAVENOUS; SUBCUTANEOUS ONCE
Status: COMPLETED | OUTPATIENT
Start: 2023-08-30 | End: 2023-08-30

## 2023-08-30 RX ORDER — SODIUM CHLORIDE, SODIUM LACTATE, POTASSIUM CHLORIDE, CALCIUM CHLORIDE 600; 310; 30; 20 MG/100ML; MG/100ML; MG/100ML; MG/100ML
1000 INJECTION, SOLUTION INTRAVENOUS ONCE
Status: COMPLETED | OUTPATIENT
Start: 2023-08-30 | End: 2023-08-30

## 2023-08-30 RX ADMIN — ONDANSETRON 4 MG: 2 INJECTION INTRAMUSCULAR; INTRAVENOUS at 14:24

## 2023-08-30 RX ADMIN — HYDROMORPHONE HYDROCHLORIDE 0.5 MG: 1 INJECTION, SOLUTION INTRAMUSCULAR; INTRAVENOUS; SUBCUTANEOUS at 12:08

## 2023-08-30 RX ADMIN — FAMOTIDINE 20 MG: 10 INJECTION, SOLUTION INTRAVENOUS at 14:24

## 2023-08-30 RX ADMIN — ONDANSETRON 4 MG: 2 INJECTION INTRAMUSCULAR; INTRAVENOUS at 12:08

## 2023-08-30 RX ADMIN — SODIUM CHLORIDE, POTASSIUM CHLORIDE, SODIUM LACTATE AND CALCIUM CHLORIDE 1000 ML: 600; 310; 30; 20 INJECTION, SOLUTION INTRAVENOUS at 12:07

## 2023-08-30 ASSESSMENT — PAIN DESCRIPTION - PAIN TYPE: TYPE: ACUTE PAIN

## 2023-08-30 ASSESSMENT — FIBROSIS 4 INDEX: FIB4 SCORE: 0.35

## 2023-08-30 NOTE — ED NOTES
Pt. Back from rad, .Pt. Resting, no changes, 3 p's addressed, call light at bedside, poc updated

## 2023-08-30 NOTE — ED NOTES
No changes, wafting on results, poc updated, 3 p's addressed, call light at bedside, waiting on results

## 2023-08-30 NOTE — ED NOTES
Patient bedside report given to DAVID Edwards . Pt AAO X 4 , respirations even and unlabored, on room air .

## 2023-08-30 NOTE — ED TRIAGE NOTES
.  Chief Complaint   Patient presents with    Abdominal Pain     Upper abd pain and cramping, states diarrhea, and nausea states pain makes it hard to breath     .Pt is alert and oriented, speaking in full sentences, follows commands and responds appropriately to questions Resp are even and unlabored.  Skin pink warm and dry     Pt placed in lobby post labs. Pt educated on triage process. Pt encouraged to alert staff for any changes.

## 2023-08-30 NOTE — ED PROVIDER NOTES
ED Provider Note    CHIEF COMPLAINT  Chief Complaint   Patient presents with    Abdominal Pain     Upper abd pain and cramping, states diarrhea, and nausea states pain makes it hard to breath       EXTERNAL RECORDS REVIEWED  Reviewed recent colonoscopy results as below:  Colonoscopy:  Diagnostic     :  Charles Vera MD     Preoperative diagnosis:  Recurrent rectal bleeding, sensation of something   intermittently protruding from the rectum     Postoperative diagnosis:1.  Medium, friable internal hemorrhoids likely   the source of bleeding.  Likely also the source of the intermittent   prolapse. 2.  Otherwise normal colon and terminal ileum      HPI/ROS  LIMITATION TO HISTORY   None  OUTSIDE HISTORIAN(S):  None    Alexia Brown is a 28 y.o. female who presents for evaluation of epigastric discomfort nausea vomiting and nonbloody diarrhea.  Patient reports that several coworkers are sick with a similar illness.  She reports some body aches malaise nausea vomiting and nonbloody diarrhea.  She reports crampy epigastric discomfort.  She specifically denies hematemesis hematochezia.  Of note the patient has had some chronic abdominal pain and recently had a colonoscopy around 3 weeks ago in [place] which was reportedly normal other than internal hemorrhoids.  No biopsies were taken.  She denies possibility of pregnancy.  She does have chronic urinary frequency but denies flank pain or hematuria or dysuria currently    PAST MEDICAL HISTORY   has a past medical history of Depression, Hypertension, and Neck pain.    SURGICAL HISTORY   has a past surgical history that includes cervical disk and fusion anterior and laparoscopy.    FAMILY HISTORY  Family History   Problem Relation Age of Onset    Alcohol abuse Mother     Drug abuse Mother     Lupus Mother     Alcohol abuse Father     Drug abuse Father     Diabetes Father     Thyroid Father     Hypertension Sister     Cancer Maternal Aunt         cervical  "   Cancer Maternal Grandmother         lung cancer    Breast Cancer Maternal Grandmother     Lupus Other        SOCIAL HISTORY  Social History     Tobacco Use    Smoking status: Former     Current packs/day: 0.00     Types: Cigarettes     Quit date: 2022     Years since quittin.6    Smokeless tobacco: Never   Vaping Use    Vaping Use: Never used   Substance and Sexual Activity    Alcohol use: Not Currently    Drug use: Yes     Types: Oral     Comment: gummie marijuana     Sexual activity: Yes       CURRENT MEDICATIONS  Home Medications       Reviewed by Grace Rodriguez R.N. (Registered Nurse) on 23 at 1056  Med List Status: Partial     Medication Last Dose Status   albuterol 108 (90 Base) MCG/ACT Aero Soln inhalation aerosol  Active   buPROPion SR (WELLBUTRIN-SR) 150 MG TABLET SR 12 HR sustained-release tablet  Active   gabapentin (NEURONTIN) 300 MG Cap  Active   lisinopril (PRINIVIL) 40 MG tablet  Active   PARAGARD INTRAUTERINE COPPER IU  Active   traMADol (ULTRAM) 50 MG Tab  Active                    ALLERGIES  No Known Allergies    PHYSICAL EXAM  VITAL SIGNS: /73   Pulse 95   Temp 36.4 °C (97.6 °F)   Resp (!) 22   Ht 1.676 m (5' 6\")   Wt 95.5 kg (210 lb 8.6 oz)   SpO2 93%   BMI 33.98 kg/m²    Pulse ox interpretation: I interpret this pulse ox as normal.  Constitutional: Alert and oriented x 3, moderate distress  HEENT: Atraumatic normocephalic, pupils are equal round reactive to light extraocular movements are intact. The nares is clear, external ears are normal, mouth shows dry mucous membranes normal dentition for age  Neck: Supple, no JVD no tracheal deviation  Cardiovascular: Regular rate and rhythm no murmur rub or gallop 2+ pulses peripherally x4  Thorax & Lungs: No respiratory distress, no wheezes rales or rhonchi, No chest tenderness.   GI: Soft nontender nondistended positive bowel sounds, diffuse bilateral lower tenderness without hernia or mass no rebound or guarding "   skin: Warm dry no acute rash or lesion  Musculoskeletal: Moving all extremities with full range and 5 of 5 strength no acute  deformity  Neurologic: Cranial nerves III through XII are grossly intact no sensory deficit no cerebellar dysfunction   Psychiatric: Anxious        DIAGNOSTIC STUDIES / PROCEDURES    LABS  Results for orders placed or performed during the hospital encounter of 08/30/23   CBC WITH DIFFERENTIAL   Result Value Ref Range    WBC 7.5 4.8 - 10.8 K/uL    RBC 5.14 4.20 - 5.40 M/uL    Hemoglobin 15.5 12.0 - 16.0 g/dL    Hematocrit 46.6 37.0 - 47.0 %    MCV 90.7 81.4 - 97.8 fL    MCH 30.2 27.0 - 33.0 pg    MCHC 33.3 32.2 - 35.5 g/dL    RDW 42.6 35.9 - 50.0 fL    Platelet Count 354 164 - 446 K/uL    MPV 9.8 9.0 - 12.9 fL    Neutrophils-Polys 64.70 44.00 - 72.00 %    Lymphocytes 24.30 22.00 - 41.00 %    Monocytes 9.40 0.00 - 13.40 %    Eosinophils 0.80 0.00 - 6.90 %    Basophils 0.50 0.00 - 1.80 %    Immature Granulocytes 0.30 0.00 - 0.90 %    Nucleated RBC 0.00 0.00 - 0.20 /100 WBC    Neutrophils (Absolute) 4.82 1.82 - 7.42 K/uL    Lymphs (Absolute) 1.81 1.00 - 4.80 K/uL    Monos (Absolute) 0.70 0.00 - 0.85 K/uL    Eos (Absolute) 0.06 0.00 - 0.51 K/uL    Baso (Absolute) 0.04 0.00 - 0.12 K/uL    Immature Granulocytes (abs) 0.02 0.00 - 0.11 K/uL    NRBC (Absolute) 0.00 K/uL   COMP METABOLIC PANEL   Result Value Ref Range    Sodium 138 135 - 145 mmol/L    Potassium 4.4 3.6 - 5.5 mmol/L    Chloride 101 96 - 112 mmol/L    Co2 26 20 - 33 mmol/L    Anion Gap 11.0 7.0 - 16.0    Glucose 99 65 - 99 mg/dL    Bun 10 8 - 22 mg/dL    Creatinine 0.76 0.50 - 1.40 mg/dL    Calcium 9.1 8.5 - 10.5 mg/dL    Correct Calcium 8.7 8.5 - 10.5 mg/dL    AST(SGOT) 48 (H) 12 - 45 U/L    ALT(SGPT) 29 2 - 50 U/L    Alkaline Phosphatase 130 (H) 30 - 99 U/L    Total Bilirubin 0.3 0.1 - 1.5 mg/dL    Albumin 4.5 3.2 - 4.9 g/dL    Total Protein 7.7 6.0 - 8.2 g/dL    Globulin 3.2 1.9 - 3.5 g/dL    A-G Ratio 1.4 g/dL   LIPASE   Result  Value Ref Range    Lipase 56 11 - 82 U/L   HCG QUAL SERUM   Result Value Ref Range    Beta-Hcg Qualitative Serum Negative Negative   URINALYSIS,CULTURE IF INDICATED    Specimen: Urine   Result Value Ref Range    Color Red (A)     Character Cloudy (A)     Specific Gravity 1.011 <1.035    Ph 5.0 5.0 - 8.0    Glucose Negative Negative mg/dL    Ketones Negative Negative mg/dL    Protein 100 (A) Negative mg/dL    Bilirubin Moderate (A) Negative    Urobilinogen, Urine 0.2 Negative    Nitrite Negative Negative    Leukocyte Esterase Moderate (A) Negative    Occult Blood Large (A) Negative    Micro Urine Req Microscopic    ESTIMATED GFR   Result Value Ref Range    GFR (CKD-EPI) 109 >60 mL/min/1.73 m 2   URINE MICROSCOPIC (W/UA)   Result Value Ref Range    WBC 20-50 (A) /hpf    RBC >150 (A) /hpf    Bacteria Negative None /hpf    Epithelial Cells Moderate (A) /hpf    Hyaline Cast 0-2 /lpf         RADIOLOGY  I have independently interpreted the diagnostic imaging associated with this visit and am waiting the final reading from the radiologist.   My preliminary interpretation is as follows: No evidence of acute surgical process  Radiologist interpretation:   CT-RENAL COLIC EVALUATION(A/P W/O)   Final Result         1.  Somewhat suboptimal without contrast.   2.  No urolithiasis or hydronephrosis.   3.  Prior cholecystectomy.   4.  IUD within the lower uterine segment.                   COURSE & MEDICAL DECISION MAKING    ED Observation Status? Yes; I am placing the patient in to an observation status due to a diagnostic uncertainty as well as therapeutic intensity. Patient placed in observation status at 2:35 PM, 8/30/2023.     Observation plan is as follows: Yara IV, perform extensive blood testing.  Administered parenteral nausea and pain medication as well as IV fluids monitor vital signs.  Perform serial abdominal exams as well as abdominal imaging    Upon Reevaluation, the patient's condition has: Improved; and will be  discharged.    Patient discharged from ED Observation status at 1740 (Time) 8/30 (Date).     INITIAL ASSESSMENT, COURSE AND PLAN  Care Narrative:   This is a very pleasant 28-year-old female who presents here with crampy lower abdominal pain, diarrhea not feeling well.  The patient had an IV established.  She required parenteral nausea and pain medication.  I was worried about possible perforated viscus colitis appendicitis diverticulitis diverticulosis not exclusively.  Patient had reassuring vital signs.  Specifically no high fever tachycardia hypoxia or hypotension.  CBC was reassuring and metabolic panel did not demonstrate any abnormalities.  She is on her menstrual cycle but had more blood than expected in her urine and I was worried about possible kidney stone as well.  Subsequent CT scan did not demonstrate any acute process that would merit surgical or medical admission.  After treatment the patient feels much better and I performed serial abdominal exam there is no peritoneal signs.  HYDRATION: Based on the patient's presentation of Acute Vomiting the patient was given IV fluids. IV Hydration was used because oral hydration was not adequate alone. Upon recheck following hydration, the patient was improved.      ADDITIONAL PROBLEM LIST    DISPOSITION AND DISCUSSIONS  I have discussed management of the patient with the following physicians and ALLISON's: None none    Discussion of management with other QHP or appropriate source(s): None    Escalation of care considered, and ultimately not performed:acute inpatient care management, however at this time, the patient is most appropriate for outpatient management    Barriers to care at this time, including but not limited to: None    Decision tools and prescription drugs considered including, but not limited to: Patient will be given a prescription for antiemetics    FINAL DIAGNOSIS  Acute abdominal pain  Nausea vomiting and diarrhea       Electronically signed by:  Milo Vora M.D., 8/30/2023 11:29 AM

## 2023-08-30 NOTE — ED NOTES
.Bedside report from Sydnee RN Assumed pt. Care at this time, poc updated, 3p's addressed, call light at bedside

## 2023-09-01 DIAGNOSIS — R74.8 ELEVATED ALKALINE PHOSPHATASE LEVEL: ICD-10-CM

## 2023-09-01 DIAGNOSIS — R76.8 POSITIVE ANA (ANTINUCLEAR ANTIBODY): ICD-10-CM

## 2023-09-01 DIAGNOSIS — R10.13 EPIGASTRIC PAIN: ICD-10-CM

## 2023-09-01 LAB
ANA CENTROSOMAL TITR SER IF: ABNORMAL {TITER}
ANA CENTROSOMAL TITR SER IF: ABNORMAL {TITER}
ANA HOMOGEN TITR SER: ABNORMAL {TITER}
ANA INTERCELL BRIDGE TITR SER IF: ABNORMAL {TITER}
ANA NUCLEAR DOTS TITR SER IF: ABNORMAL {TITER}
ANA NUCLEAR MEMBRANE PORES TITR SER IF: ABNORMAL {TITER}
ANA NUCLEOLAR TITR SER: ABNORMAL {TITER}
ANA PLUS 12 INTERPRETATION: ABNORMAL
ANA SER QL IF: POSITIVE
ANA SPECKLED TITR SER: ABNORMAL {TITER}
BASOPHILS # BLD AUTO: 0 X10E3/UL (ref 0–0.2)
BASOPHILS NFR BLD AUTO: 1 %
C3 SERPL-MCNC: 169 MG/DL (ref 82–167)
C4 SERPL-MCNC: 27 MG/DL (ref 14–44)
CARDIOLIPIN IGA SER IA-ACNC: <12 APL U/ML
CARDIOLIPIN IGG SER IA-ACNC: <15 GPL U/ML
CARDIOLIPIN IGM SER IA-ACNC: <13 MPL U/ML
CCP IGA+IGG SERPL IA-ACNC: <20 UNITS
CENTROMERE AB TITR SER IF: ABNORMAL {TITER}
CHOLEST SERPL-MCNC: 166 MG/DL (ref 100–199)
CHROMATIN IGG SERPL-ACNC: <20 UNITS
DSDNA AB SER FARR-ACNC: <8 IU/ML
EBV NA IGG SER IA-ACNC: >600 U/ML (ref 0–17.9)
EBV VCA IGG SER IA-ACNC: >600 U/ML (ref 0–17.9)
EBV VCA IGM SER IA-ACNC: <36 U/ML (ref 0–35.9)
ENA PCNA AB TITR SER IF: ABNORMAL {TITER}
ENA SCL70 AB SER IA-ACNC: <20 UNITS
ENA SM AB SER-ACNC: <20 UNITS
ENA SS-A AB SER IA-ACNC: <20 UNITS
ENA SS-B AB SER IA-ACNC: <20 UNITS
EOSINOPHIL # BLD AUTO: 0 X10E3/UL (ref 0–0.4)
EOSINOPHIL NFR BLD AUTO: 0 %
ERYTHROCYTE [DISTWIDTH] IN BLOOD BY AUTOMATED COUNT: 12.8 % (ref 11.7–15.4)
GLUCOSE SERPL-MCNC: 95 MG/DL (ref 70–99)
HCT VFR BLD AUTO: 44 % (ref 34–46.6)
HCV AB SERPL QL IA: NORMAL
HCV IGG SERPL QL IA: NON REACTIVE
HDLC SERPL-MCNC: 39 MG/DL
HGB BLD-MCNC: 15.3 G/DL (ref 11.1–15.9)
IMM GRANULOCYTES # BLD AUTO: 0 X10E3/UL (ref 0–0.1)
IMM GRANULOCYTES NFR BLD AUTO: 0 %
IMMATURE CELLS  115398: ABNORMAL
LABORATORY COMMENT REPORT: ABNORMAL
LABORATORY COMMENT REPORT: ABNORMAL
LDH SERPL L TO P-CCNC: 162 IU/L (ref 119–226)
LDLC SERPL CALC-MCNC: 113 MG/DL (ref 0–99)
LYMPHOCYTES # BLD AUTO: 0.5 X10E3/UL (ref 0.7–3.1)
LYMPHOCYTES NFR BLD AUTO: 12 %
MCH RBC QN AUTO: 31.4 PG (ref 26.6–33)
MCHC RBC AUTO-ENTMCNC: 34.8 G/DL (ref 31.5–35.7)
MCV RBC AUTO: 90 FL (ref 79–97)
MITOTIC SPINDLE APP AB TITR SERPL IF: ABNORMAL {TITER}
MONOCYTES # BLD AUTO: 0.3 X10E3/UL (ref 0.1–0.9)
MONOCYTES NFR BLD AUTO: 7 %
MORPHOLOGY BLD-IMP: ABNORMAL
NEUTROPHILS # BLD AUTO: 3.6 X10E3/UL (ref 1.4–7)
NEUTROPHILS NFR BLD AUTO: 80 %
NRBC BLD AUTO-RTO: ABNORMAL %
PLATELET # BLD AUTO: 286 X10E3/UL (ref 150–450)
RBC # BLD AUTO: 4.88 X10E6/UL (ref 3.77–5.28)
RHEUMATOID FACT SERPL-ACNC: <14 IU/ML
SERVICE CMNT-IMP: ABNORMAL
THYROGLOB AB SERPL-ACNC: <1 IU/ML (ref 0–0.9)
THYROPEROXIDASE AB SERPL-ACNC: 12 IU/ML (ref 0–34)
THYROPEROXIDASE AB SERPL-ACNC: ABNORMAL IU/ML
TRIGL SERPL-MCNC: 70 MG/DL (ref 0–149)
U1 SNRNP AB SER IA-ACNC: <20 UNITS
VLDLC SERPL CALC-MCNC: 14 MG/DL (ref 5–40)
WBC # BLD AUTO: 4.5 X10E3/UL (ref 3.4–10.8)

## 2023-09-06 ENCOUNTER — HOSPITAL ENCOUNTER (OUTPATIENT)
Dept: RADIOLOGY | Facility: MEDICAL CENTER | Age: 28
End: 2023-09-06
Attending: PHYSICIAN ASSISTANT
Payer: COMMERCIAL

## 2023-09-06 DIAGNOSIS — R10.13 EPIGASTRIC PAIN: ICD-10-CM

## 2023-09-06 DIAGNOSIS — R74.8 ELEVATED ALKALINE PHOSPHATASE LEVEL: ICD-10-CM

## 2023-09-06 DIAGNOSIS — R76.8 POSITIVE ANA (ANTINUCLEAR ANTIBODY): ICD-10-CM

## 2023-09-06 PROCEDURE — 74181 MRI ABDOMEN W/O CONTRAST: CPT

## 2023-09-14 ENCOUNTER — HOSPITAL ENCOUNTER (OUTPATIENT)
Dept: LAB | Facility: MEDICAL CENTER | Age: 28
End: 2023-09-14
Attending: PHYSICIAN ASSISTANT
Payer: COMMERCIAL

## 2023-09-14 DIAGNOSIS — R74.8 ELEVATED ALKALINE PHOSPHATASE LEVEL: ICD-10-CM

## 2023-09-14 DIAGNOSIS — R10.13 EPIGASTRIC PAIN: ICD-10-CM

## 2023-09-14 DIAGNOSIS — R76.8 POSITIVE ANA (ANTINUCLEAR ANTIBODY): ICD-10-CM

## 2023-09-14 LAB
ALBUMIN SERPL BCP-MCNC: 4.1 G/DL (ref 3.2–4.9)
ALP SERPL-CCNC: 116 U/L (ref 30–99)
ALT SERPL-CCNC: 18 U/L (ref 2–50)
AST SERPL-CCNC: 15 U/L (ref 12–45)
BILIRUB CONJ SERPL-MCNC: <0.2 MG/DL (ref 0.1–0.5)
BILIRUB INDIRECT SERPL-MCNC: ABNORMAL MG/DL (ref 0–1)
BILIRUB SERPL-MCNC: 0.3 MG/DL (ref 0.1–1.5)
CRP SERPL HS-MCNC: <0.3 MG/DL (ref 0–0.75)
ERYTHROCYTE [SEDIMENTATION RATE] IN BLOOD BY WESTERGREN METHOD: 6 MM/HOUR (ref 0–25)
FERRITIN SERPL-MCNC: 22.2 NG/ML (ref 10–291)
GGT SERPL-CCNC: 63 U/L (ref 7–34)
IRON SATN MFR SERPL: 31 % (ref 15–55)
IRON SERPL-MCNC: 84 UG/DL (ref 40–170)
PROT SERPL-MCNC: 7.2 G/DL (ref 6–8.2)
TIBC SERPL-MCNC: 268 UG/DL (ref 250–450)
UIBC SERPL-MCNC: 184 UG/DL (ref 110–370)

## 2023-09-14 PROCEDURE — 83540 ASSAY OF IRON: CPT

## 2023-09-14 PROCEDURE — 85652 RBC SED RATE AUTOMATED: CPT

## 2023-09-14 PROCEDURE — 82390 ASSAY OF CERULOPLASMIN: CPT

## 2023-09-14 PROCEDURE — 82977 ASSAY OF GGT: CPT

## 2023-09-14 PROCEDURE — 86381 MITOCHONDRIAL ANTIBODY EACH: CPT

## 2023-09-14 PROCEDURE — 83550 IRON BINDING TEST: CPT

## 2023-09-14 PROCEDURE — 86140 C-REACTIVE PROTEIN: CPT

## 2023-09-14 PROCEDURE — 80076 HEPATIC FUNCTION PANEL: CPT

## 2023-09-14 PROCEDURE — 36415 COLL VENOUS BLD VENIPUNCTURE: CPT

## 2023-09-14 PROCEDURE — 86015 ACTIN ANTIBODY EACH: CPT

## 2023-09-14 PROCEDURE — 82728 ASSAY OF FERRITIN: CPT

## 2023-09-14 PROCEDURE — 82103 ALPHA-1-ANTITRYPSIN TOTAL: CPT

## 2023-09-14 PROCEDURE — 83516 IMMUNOASSAY NONANTIBODY: CPT

## 2023-09-16 LAB
A1AT SERPL-MCNC: 122 MG/DL (ref 90–200)
CERULOPLASMIN SERPL-MCNC: 25 MG/DL (ref 16–45)
MITOCHONDRIA M2 IGG SER-ACNC: 3.5 UNITS (ref 0–24.9)
MYELOPEROXIDASE AB SER-ACNC: 0 AU/ML (ref 0–19)
PROTEINASE3 AB SER-ACNC: 0 AU/ML (ref 0–19)
SMA IGG SER-ACNC: 11 UNITS (ref 0–19)

## 2023-09-18 ENCOUNTER — TELEMEDICINE (OUTPATIENT)
Dept: MEDICAL GROUP | Facility: IMAGING CENTER | Age: 28
End: 2023-09-18
Payer: COMMERCIAL

## 2023-09-18 VITALS — HEIGHT: 67 IN | WEIGHT: 210 LBS | BODY MASS INDEX: 32.96 KG/M2

## 2023-09-18 DIAGNOSIS — R53.83 FATIGUE, UNSPECIFIED TYPE: ICD-10-CM

## 2023-09-18 DIAGNOSIS — R21 RASH: ICD-10-CM

## 2023-09-18 DIAGNOSIS — Z84.0 FAMILY HISTORY OF LUPUS ERYTHEMATOSUS: ICD-10-CM

## 2023-09-18 DIAGNOSIS — L29.9 ITCHING: ICD-10-CM

## 2023-09-18 DIAGNOSIS — R76.8 POSITIVE ANA (ANTINUCLEAR ANTIBODY): ICD-10-CM

## 2023-09-18 PROCEDURE — 99214 OFFICE O/P EST MOD 30 MIN: CPT | Mod: 95 | Performed by: PHYSICIAN ASSISTANT

## 2023-09-18 ASSESSMENT — FIBROSIS 4 INDEX: FIB4 SCORE: 0.28

## 2023-09-18 NOTE — PROGRESS NOTES
Virtual Visit: Established Patient   This visit was conducted via Zoom using secure and encrypted videoconferencing technology. The patient was in a private location in the state of [place].    The patient's identity was confirmed and verbal consent was obtained for this virtual visit.    Subjective:   CC:   Chief Complaint   Patient presents with    Follow-Up     Labs, imaging       Alexia Brown is a 28 y.o. female presenting for evaluation and management of:    Positive HÉCTOR (antinuclear antibody)  Pt states that she always feels horrible. Feels constant fatigue. Describes pinching pain along right upper and nausea with eating higher fat foods. Has loose and watery diarrhea 3-4 times per day for the past 6 months. Admits to chronic fatigue and a malar rash. States that she gets intermittently itchy and has frequent hives. States that her mom has lupus.     Latest Reference Range & Units 08/18/23 05:32 09/14/23 08:11   Anti-dsDNA Ab by K[place]ney(RDL) <8.0 IU/mL <8.0    Anti-Ro (SS-A) Ab (RDL) <20 Units <20    Anti-La (SS-B) Ab (RDL) <20 Units <20    Anti-Scl-70 Ab (RDL) <20 Units <20    Anti-CCP Ab, IgG + IgA (RDL) <20 Units <20    Anti-Nuclear Ab by IFA (RDL) Negative  Positive !    Anti-Centromere Ab (RDL) <1:40  <1:40    Anti-Sm Ab (RDL) <20 Units <20    Anti-U1 RNP Ab (RDL) <20 Units <20    Anti-Mitochondrial Ab 0.0 - 24.9 Units  3.5   Microsomal -Tpo- Abs 0 - 34 IU/mL 12    F-Actin Ab, IgG 0 - 19 Units  11   Speckled <1:40  1:160 (H)    Nuclear Dot Pattern <1:40  1:640 (H)    Myeloperoxidase Ab 0 - 19 AU/mL  0   Serine Proteinase 3, IgG 0 - 19 AU/mL  0      Latest Reference Range & Units 08/18/23 05:32 09/14/23 08:11   Alpha-1-Antitrypsin 90 - 200 mg/dL  122   C3 Complement 82 - 167 mg/dL 169 (H)    Ceruloplasmin 16 - 45 mg/dL  25   Complement C4 14 - 44 mg/dL 27    Ferritin 10.0 - 291.0 ng/mL  22.2   Thyroglob Ab 0.0 - 0.9 IU/mL <1.0    Stat C-Reactive Protein 0.00 - 0.75 mg/dL  <0.30   Rheumatoid Arthritis  Factor <14 IU/mL <14        ROS   Denies any recent fevers or chills. No nausea or vomiting. No chest pains or shortness of breath.     No Known Allergies    Current medicines (including changes today)  Current Outpatient Medications   Medication Sig Dispense Refill    ondansetron (ZOFRAN ODT) 4 MG TABLET DISPERSIBLE Take 1 Tablet by mouth every 6 hours as needed for Nausea/Vomiting. 10 Tablet 0    PARAGARD INTRAUTERINE COPPER IU by Intrauterine route.      albuterol 108 (90 Base) MCG/ACT Aero Soln inhalation aerosol Inhale.      buPROPion SR (WELLBUTRIN-SR) 150 MG TABLET SR 12 HR sustained-release tablet TAKE ONE TABLETS BY MOUTH EVERY MORNING      traMADol (ULTRAM) 50 MG Tab Take 50 mg by mouth every day.      lisinopril (PRINIVIL) 40 MG tablet Take 40 mg by mouth every day.      gabapentin (NEURONTIN) 300 MG Cap Take 300 mg by mouth every day.       No current facility-administered medications for this visit.       Patient Active Problem List    Diagnosis Date Noted    Positive HÉCTOR (antinuclear antibody) 09/18/2023    IGT (impaired glucose tolerance) 08/17/2023    Multiple thyroid nodules 08/17/2023    Cervical lymphadenopathy 08/17/2023    Fatigue 08/17/2023    Rectal bleeding 06/20/2023    Class 1 obesity due to excess calories with serious comorbidity and body mass index (BMI) of 33.0 to 33.9 in adult 06/20/2023    Hypertension     Neck pain        Family History   Problem Relation Age of Onset    Alcohol abuse Mother     Drug abuse Mother     Lupus Mother     Alcohol abuse Father     Drug abuse Father     Diabetes Father     Thyroid Father     Hypertension Sister     Cancer Maternal Aunt         cervical    Cancer Maternal Grandmother         lung cancer    Breast Cancer Maternal Grandmother     Lupus Other        She  has a past medical history of Depression, Hypertension, and Neck pain.  She  has a past surgical history that includes cervical disk and fusion anterior and laparoscopy.         Objective:   Ht  "1.689 m (5' 6.5\")   Wt 95.3 kg (210 lb)   BMI 33.39 kg/m²   RR 12    Physical Exam:  Constitutional: Alert, no distress, well-groomed.  Skin: No rashes in visible areas.  Eye: Round. Conjunctiva clear, lids normal. No icterus.   ENMT: Lips pink without lesions, good dentition, moist mucous membranes. Phonation normal.  Neck: No masses, no thyromegaly. Moves freely without pain.  Respiratory: Unlabored respiratory effort, no cough or audible wheeze  Psych: Alert and oriented x3, normal affect and mood.     Assessment and Plan:   The following treatment plan was discussed:     1. Positive HÉCTOR (antinuclear antibody)  With elevated Nuclear Dot Pattern, intermittently elevated LFTs with abd pain, itching, fatigue, and fam h/o lupus.   - Referral to Rheumatology    2. Fatigue, unspecified type  - Referral to Rheumatology    3. Itching  - Referral to Rheumatology    4. Rash  - Referral to Rheumatology    5. Family history of lupus erythematosus  - Referral to Rheumatology    My total time spent caring for the patient on the day of the encounter was 30 minutes.   This does not include time spent on separately billable procedures/tests.      Follow-up: Return for Will notify patient to follow-up pending tests.         Hanny Barney PA-C ([place])  Physician Assistant Certified  [facility]    Please note that this dictation was created using voice recognition software. I have made every reasonable attempt to correct obvious errors, but I expect that there are errors of grammar and possibly content that I did not discover before finalizing the note.  "

## 2023-09-18 NOTE — ASSESSMENT & PLAN NOTE
Pt states that she always feels horrible. Feels constant fatigue. Describes pinching pain along right upper and nausea with eating higher fat foods. Has loose and watery diarrhea 3-4 times per day for the past 6 months. Admits to chronic fatigue and a malar rash. States that she gets intermittently itchy and has frequent hives. States that her mom has lupus.

## 2023-09-18 NOTE — PATIENT INSTRUCTIONS
Gastroenterology   [facility]   [address] [place] [address]   Phone: [phone]

## 2023-10-10 ENCOUNTER — APPOINTMENT (OUTPATIENT)
Dept: MEDICAL GROUP | Facility: IMAGING CENTER | Age: 28
End: 2023-10-10
Payer: COMMERCIAL

## 2023-10-13 DIAGNOSIS — M54.2 NECK PAIN: ICD-10-CM

## 2023-12-07 ENCOUNTER — APPOINTMENT (OUTPATIENT)
Dept: INTERNAL MEDICINE | Facility: OTHER | Age: 28
End: 2023-12-07
Payer: COMMERCIAL

## 2023-12-09 ENCOUNTER — OFFICE VISIT (OUTPATIENT)
Dept: URGENT CARE | Facility: CLINIC | Age: 28
End: 2023-12-09
Payer: COMMERCIAL

## 2023-12-09 VITALS
BODY MASS INDEX: 32.65 KG/M2 | SYSTOLIC BLOOD PRESSURE: 130 MMHG | OXYGEN SATURATION: 96 % | HEART RATE: 73 BPM | HEIGHT: 67 IN | DIASTOLIC BLOOD PRESSURE: 74 MMHG | WEIGHT: 208 LBS | RESPIRATION RATE: 16 BRPM | TEMPERATURE: 98.5 F

## 2023-12-09 DIAGNOSIS — L72.3 INFECTED SEBACEOUS CYST: ICD-10-CM

## 2023-12-09 DIAGNOSIS — L08.9 INFECTED SEBACEOUS CYST: ICD-10-CM

## 2023-12-09 PROCEDURE — 3078F DIAST BP <80 MM HG: CPT | Performed by: FAMILY MEDICINE

## 2023-12-09 PROCEDURE — 99213 OFFICE O/P EST LOW 20 MIN: CPT | Performed by: FAMILY MEDICINE

## 2023-12-09 PROCEDURE — 3075F SYST BP GE 130 - 139MM HG: CPT | Performed by: FAMILY MEDICINE

## 2023-12-09 RX ORDER — SULFAMETHOXAZOLE AND TRIMETHOPRIM 800; 160 MG/1; MG/1
1 TABLET ORAL 2 TIMES DAILY
Qty: 14 TABLET | Refills: 0 | Status: SHIPPED | OUTPATIENT
Start: 2023-12-09 | End: 2023-12-16

## 2023-12-09 ASSESSMENT — ENCOUNTER SYMPTOMS
CARDIOVASCULAR NEGATIVE: 1
CONSTITUTIONAL NEGATIVE: 1
RESPIRATORY NEGATIVE: 1

## 2023-12-09 ASSESSMENT — FIBROSIS 4 INDEX: FIB4 SCORE: 0.28

## 2023-12-09 NOTE — PROGRESS NOTES
"Subjective:   Alexia Brown is a 28 y.o. female who presents for Bump (Lump on upper back, headache, redness, x 2 days )      HPI    Review of Systems   Constitutional: Negative.    Respiratory: Negative.     Cardiovascular: Negative.    Skin:  Positive for rash.       Medications, Allergies, and current problem list reviewed today in Epic.     Objective:     /74 (BP Location: Left arm, Patient Position: Sitting, BP Cuff Size: Adult)   Pulse 73   Temp 36.9 °C (98.5 °F) (Temporal)   Resp 16   Ht 1.702 m (5' 7\")   Wt 94.3 kg (208 lb)   SpO2 96%     Physical Exam  Vitals and nursing note reviewed.   Constitutional:       Appearance: Normal appearance.   Skin:     Comments: 1 cm seb cyst that is tender, red and swollen, no drainage at this time.   Neurological:      Mental Status: She is alert.         Assessment/Plan:     Diagnosis and associated orders:     1. Infected sebaceous cyst  sulfamethoxazole-trimethoprim (BACTRIM DS) 800-160 MG tablet         Comments/MDM:     Return if swelling and pain increase for I&D         Differential diagnosis, natural history, supportive care, and indications for immediate follow-up discussed.    Advised the patient to follow-up with the primary care physician for recheck, reevaluation, and consideration of further management.    Please note that this dictation was created using voice recognition software. I have made a reasonable attempt to correct obvious errors, but I expect that there are errors of grammar and possibly content that I did not discover before finalizing the note.    This note was electronically signed by Jaswinder Aguayo M.D.  "

## 2023-12-20 ENCOUNTER — TELEPHONE (OUTPATIENT)
Dept: MEDICAL GROUP | Facility: IMAGING CENTER | Age: 28
End: 2023-12-20
Payer: COMMERCIAL

## 2023-12-20 NOTE — TELEPHONE ENCOUNTER
Caller Name: Alexia Brown   Call Back Number: [phone] (home)      How would the patient prefer to be contacted with a response: Phone call do NOT leave a detailed message    Patient report having stomach pain.  She will go to urgent care in [place] 12/20/2023

## 2024-01-11 ENCOUNTER — APPOINTMENT (OUTPATIENT)
Dept: SLEEP MEDICINE | Facility: MEDICAL CENTER | Age: 29
End: 2024-01-11
Attending: STUDENT IN AN ORGANIZED HEALTH CARE EDUCATION/TRAINING PROGRAM
Payer: COMMERCIAL

## 2024-01-22 ENCOUNTER — TELEPHONE (OUTPATIENT)
Dept: HEALTH INFORMATION MANAGEMENT | Facility: OTHER | Age: 29
End: 2024-01-22

## 2024-01-22 ENCOUNTER — APPOINTMENT (OUTPATIENT)
Dept: URGENT CARE | Facility: CLINIC | Age: 29
End: 2024-01-22
Payer: COMMERCIAL

## 2024-01-24 ENCOUNTER — APPOINTMENT (OUTPATIENT)
Dept: SLEEP MEDICINE | Facility: MEDICAL CENTER | Age: 29
End: 2024-01-24
Attending: PREVENTIVE MEDICINE
Payer: COMMERCIAL

## 2024-03-09 ASSESSMENT — RHEUMATOLOGY NEW PATIENT QUESTIONNAIRE
DURATION: <30 MINS
THYROID SWELLING: Y
CHILLS: Y
ANXIETY: Y
FATIGUE: Y
BODY ACHES: Y
NAUSEA: Y
JOINT PAIN: BETTER WITH ACTIVITY
COLD-INDUCED COLOR CHANGES (WHITE, PURPLE, RED ON REWARMING): FINGERS
LYMPH NODE SWELLING: NECK
SUNLIGHT-INDUCED SKIN RASH: FACE
SUNLIGHT-INDUCED SKIN RASH: NECK
HEADACHES: Y
INSOMNIA: Y
LIST CURRENT PROBLEMS: HIGH BLOOD PRESSURE
FEVERS: Y
MALAISE: Y
DEPRESSION: Y
JOINT SWELLING: Y
VOMITING: Y
SUNLIGHT-INDUCED SKIN RASH: CHEST
ABDOMINAL PAIN: Y
HEARTBURN: Y
MUCUS STOOL: Y
HAIR SHEDDING: Y
BURNING: Y
NIGHT SWEATS: Y
MARK ALL THE AREAS OF PAIN: 98245424
COLD-INDUCED COLOR CHANGES (WHITE, PURPLE, RED ON REWARMING): TOES
NUMBNESS: Y
SPASMS: Y
BLOODY STOOL: Y
EXACERBATING_FACTORS: IM NOT SURE
RATE_1TO10: 5
SNORING: Y
DRY COUGH: Y

## 2024-03-12 ENCOUNTER — OFFICE VISIT (OUTPATIENT)
Dept: RHEUMATOLOGY | Facility: MEDICAL CENTER | Age: 29
End: 2024-03-12
Attending: STUDENT IN AN ORGANIZED HEALTH CARE EDUCATION/TRAINING PROGRAM
Payer: COMMERCIAL

## 2024-03-12 VITALS
WEIGHT: 229 LBS | OXYGEN SATURATION: 96 % | HEART RATE: 84 BPM | BODY MASS INDEX: 35.94 KG/M2 | HEIGHT: 67 IN | DIASTOLIC BLOOD PRESSURE: 84 MMHG | TEMPERATURE: 97.8 F | SYSTOLIC BLOOD PRESSURE: 144 MMHG

## 2024-03-12 DIAGNOSIS — R21 RASH AND NONSPECIFIC SKIN ERUPTION: ICD-10-CM

## 2024-03-12 DIAGNOSIS — R76.8 SEROLOGIC AUTOIMMUNITY: ICD-10-CM

## 2024-03-12 DIAGNOSIS — B27.00 POSITIVE TEST FOR EPSTEIN-BARR VIRUS (EBV): ICD-10-CM

## 2024-03-12 PROCEDURE — 3079F DIAST BP 80-89 MM HG: CPT | Performed by: STUDENT IN AN ORGANIZED HEALTH CARE EDUCATION/TRAINING PROGRAM

## 2024-03-12 PROCEDURE — 3077F SYST BP >= 140 MM HG: CPT | Performed by: STUDENT IN AN ORGANIZED HEALTH CARE EDUCATION/TRAINING PROGRAM

## 2024-03-12 PROCEDURE — 99204 OFFICE O/P NEW MOD 45 MIN: CPT | Performed by: STUDENT IN AN ORGANIZED HEALTH CARE EDUCATION/TRAINING PROGRAM

## 2024-03-12 PROCEDURE — 99212 OFFICE O/P EST SF 10 MIN: CPT | Performed by: STUDENT IN AN ORGANIZED HEALTH CARE EDUCATION/TRAINING PROGRAM

## 2024-03-12 ASSESSMENT — PATIENT HEALTH QUESTIONNAIRE - PHQ9: CLINICAL INTERPRETATION OF PHQ2 SCORE: 0

## 2024-03-12 ASSESSMENT — FIBROSIS 4 INDEX: FIB4 SCORE: 0.28

## 2024-03-12 NOTE — PATIENT INSTRUCTIONS
AFTER VISIT INSTRUCTIONS    Below are important information to help you navigate your healthcare needs and help us serve you safely and effectively:  If laboratory tests and/or imaging studies were ordered, remember to go get them done as instructed.  If new prescriptions and/or refills were sent, remember to go pick them up from your local pharmacy, or call the specialty pharmacy to request shipment.  Always take your prescription medications exactly as prescribed unless instructed otherwise.  Note that antirheumatic drugs and steroids are immunosuppressive which means they increase your risk of infections and have multiple potential adverse effects on various organ systems in your body, though many of them are uncommon.  It is important that you are up-to-date on age-appropriate immunizations, particularly shingles and bacterial/viral pneumonia vaccines, which you can request from me or your primary care provider.  Be sure to read the drug package inserts to learn about the potential side effects of your medications before you start taking them.  If you experience any significant drug side effects, stop taking the medication and notify me promptly, and depending on the severity of the side effects, consider going to an urgent care or emergency department for immediate attention.  If there are significant findings on your lab tests and imaging studies that warrant further action, I will notify you with explanations via [employer] or phone call, otherwise you can view them on i-marker and let me know if you have any questions.  Note that i-marker messages are typically read during office hours and may take 1-7 business days before a response depending on the urgency of the situation and how busy my clinic schedule is.  In general, i-marker messaging is for non-urgent matters that do not require immediate attention, so for urgent matters that cannot wait, you are advised to go to an urgent care.  You are granted [employer]  access to my documentation of your visit and are encouraged to read my note which details my assessment and plan for your condition.  To learn more about your condition and rheumatic diseases evaluated and treated by rheumatologists, as well as gain access to many helpful resources about these diseases, visit our website: [email or web address].  To properly dispose of your unused, unwanted, or residual medications/supplies, visit the Drug Enforcement Administration website to locate your closest drop-off location: www.[place].gov/everyday-takeback-day.

## 2024-03-12 NOTE — PROGRESS NOTES
[facility] RHEUMATOLOGY  [address], [address], [place] [address]  Phone: [phone] ? Fax: [phone]  [facility]/Health-Services/Rheumatology    NEW CONSULT VISIT NOTE      DATE OF SERVICE: 03/12/2024         Subjective     REFERRING PRACTITIONER:  Hanny Barney P.A.-C.  [address] [place] [id number]    PATIENT IDENTIFICATION:  Alexia Brown  [address] [address]    YOB: 1995    MEDICAL RECORD NUMBER: [id number]         CHIEF COMPLAINT:   Chief Complaint   Patient presents with    New Patient     Positive HÉCTOR (antinuclear antibody)       HISTORY OF PRESENT ILLNESS:  Alexia Brown is a 28 y.o. female with pertinent history notable for HTN, obesity, multiple thyroid nodules (TSH and T4 WNL in 7/2023), s/p cholecystectomy (in 2015), s/p anterior cervical fusion (in 2020), and anxiety among other comorbidities. Presents for evaluation of unexplained symptoms in the setting of positive HÉCTOR that raised concern for autoimmune disease. Reports onset of symptoms in June 2023 with variable course including on/off hand/foot pain, crampy abdominal pain which can be excruciating for her at times and frequent intermittent diarrhea. Has often been to the ED with largely negative workup. Patient gets rash on bilateral cheeks on and off, every 2 months. Notes hives as well on neck and chest, occurring almost daily. Rash on cheeks and neck/chest can occur with excessive sunlight exposure, or clothing/pressure.  Also notes joint pain in bilateral wrists and hands and right foot. Pain is worse in the morning and improves with activity. Associated joint stiffness for 30 minutes in the morning. Symptoms began after becoming infected with COVID in May of 2023. Notes maternal family history of autoimmune disease, as well as thyroid disease in her father and maternal aunt (however with negative antithyroglobulin/anti-TPO). Work as a , so using hands frequently.  Patient also reports history of being  sick in her adolescence, with noted positive EBV VCA IgG and NA IgG, however negative IgM. Reports other aspects of her symptoms and medical history as noted on the questionnaire below.    Pertinent positive labs: Positive HÉCTOR positive HÉCTOR 1:640 nuclear dot pattern and 1:160 speckled pattern with negative reflex panel, positive IgG anti-EBV with negative IgM, and elevated C3 of 169 with normal C4 of 27 (in 8/2023); positive SARS-CoV-2 (in 5/2023); elevated <<277 with normal AST 15<<142 and ALT 18<<278 (in 9/2023<<5/2023).    Pertinent negative labs: Negative TSH and FT4 (in 7/2023), anti-TPO, anti-TG, RF, anti-CCP, and CBC (in 8/2023), anti-MPO, anti-PR3, ASMA, AMA, alpha-1-antitrypsin, ceruloplasmin, ferritin, CRP and ESR (in 9/2023).    [facility] New Patient History Form    3/9/2024  8:55 AM PST - Filed by Patient   Demographic Information   Marital Status: Single   Occupation:    Highest Level of Education: Trade license   MAIN REASON FOR VISIT Struggling with severe stomach pain, body swell, flu, like symptoms all the time cant loose weight   HISTORY OF PRESENT ILLNESS   Date of symptom onset: For last few years   Preceding incident/ailment:    Describe/list your symptoms: Stimach pain, upper roght abdominal pain diarrhea, swelling in my hands flue all the time   Exacerbating factors: Im not sure   Alleviating factors: If i dont eat and bakari not to stress   Helpful medications:    Ineffective medications:    Severity of pain (scale of 1-10): 5   Personal/emotional stressors: Allot of anxiety   Kehinde All The Areas Of Pain    REVIEW OF SYMPTOMS    General   Fevers Yes   Chills Yes   Night sweats Yes   Malaise Yes   Fatigue Yes   Unintentional weight loss    Musculoskeletal   Joint pain Better with activity   Morning stiffness duration <30 mins   Morning stiffness characteristic    Joint swelling Yes   Joint instability    Tendon pain    Muscle pain     Body aches Yes   Dermatologic   Hair loss with bald spots    Hair shedding Yes   Skin thickening    Skin plaques    Sunlight-induced skin rash Face;  Neck;  Chest   Cold-induced color changes (white, purple, red on rewarming) Fingers;  Toes   Neurologic/Psychiatric   Weakness    Spasms Yes   Tingling    Burning Yes   Numbness Yes   Insomnia Yes   Anxiety Yes   Depression Yes   Head/Eyes   Headaches Yes   Temple pain    Dizziness    Dry eyes    Eye pain    Eye redness    Blurry vision    Vision loss    Ears/Nose   Ear pain    Ringing in ears    Vertigo    Hearing loss    Nasal ulcers    Nosebleeds    Sinus pain    Nasal congestion    Snoring Yes   Mouth/Throat   Oral ulcers    Bleeding gums    Dry mouth    Cavities    Sore throat    Sticking in throat    Difficulty speaking    Neck/Lymphatics   Thyroid pain    Thyroid swelling Yes   Lymph node swelling Neck   Cardiac/Respiratory   Chest pain with breathing    Dry cough Yes   Cough with bloody phlegm    Shortness of breath    Fast heartbeats    Irregular heartbeats    Gastrointestinal   Nausea Yes   Vomiting Yes   Difficulty swallowing    Heartburn Yes   Abdominal pain Yes   Bloody stool Yes   Mucus stool Yes   Genitourinary   Pelvic pain    Genital ulcers    Abnormal discharge    Burning urination    Frothy urine    Blood in urine    MEDICAL/PERSONAL HISTORY DETAILS   Current Medical Problems: High blood pressure   Past/Resolved Medical Problems:    Surgeries/Procedures (include month/year): 2020 disk replacement in neck - 2013 laparoscopy - 2015 gal bladder removed   Prescription/Over-The-Counter/Herbal Medications: Sleep tea   Medication/Food/Material Allergies (include reactions): None that i know of   Immunizations (include month/year)    Alcohol/Tobacco/Recreational Drugs: I dont drink quit smoking 2 yrs ago maybe 3 yrs ago   Family Medical History (father, mother, siblings only): Dad has thyroid problems, mother had somthing like lupus high blood pressure.        REVIEW OF SYSTEMS:  Except as noted in the history above, relevant review of systems with emphasis on autoimmune rheumatic diseases was otherwise negative.      ACTIVE PROBLEM LIST:  Patient Active Problem List    Diagnosis Date Noted    Positive test for Donavon-Barr virus (EBV) 2024    Rash and nonspecific skin eruption 2024    Serologic autoimmunity 2023    IGT (impaired glucose tolerance) 2023    Multiple thyroid nodules 2023    Cervical lymphadenopathy 2023    Fatigue 2023    Rectal bleeding 2023    Class 1 obesity due to excess calories with serious comorbidity and body mass index (BMI) of 33.0 to 33.9 in adult 2023    Hypertension     Neck pain        PAST MEDICAL HISTORY:  Past Medical History:   Diagnosis Date    Depression     Hypertension     Neck pain        PAST SURGICAL HISTORY:  Past Surgical History:   Procedure Laterality Date    CERVICAL DISK AND FUSION ANTERIOR      LAPAROSCOPY      abdomen        SOCIAL HISTORY:   Social History     Socioeconomic History    Marital status: Single   Tobacco Use    Smoking status: Former     Current packs/day: 0.00     Types: Cigarettes     Quit date: 2022     Years since quittin.1    Smokeless tobacco: Never   Vaping Use    Vaping Use: Never used   Substance and Sexual Activity    Alcohol use: Not Currently    Drug use: Yes     Types: Oral     Comment: gummie marijuana     Sexual activity: Yes       FAMILY HISTORY:  Family History   Problem Relation Age of Onset    Alcohol abuse Mother     Drug abuse Mother     Lupus Mother     Alcohol abuse Father     Drug abuse Father     Diabetes Father     Thyroid Father     Hypertension Sister     Cancer Maternal Aunt         cervical    Cancer Maternal Grandmother         lung cancer    Breast Cancer Maternal Grandmother     Lupus Other        MEDICATIONS:  Current Outpatient Medications   Medication Sig    albuterol 108 (90 Base) MCG/ACT Aero Soln  "inhalation aerosol Inhale.    buPROPion SR (WELLBUTRIN-SR) 150 MG TABLET SR 12 HR sustained-release tablet TAKE ONE TABLETS BY MOUTH EVERY MORNING    traMADol (ULTRAM) 50 MG Tab Take 50 mg by mouth every day.    lisinopril (PRINIVIL) 40 MG tablet Take 40 mg by mouth every day.    gabapentin (NEURONTIN) 300 MG Cap Take 300 mg by mouth every day.       ALLERGIES:   No Known Allergies    IMMUNIZATIONS:  Immunization History   Administered Date(s) Administered    DTP - Historical vaccine 1995, 1995, 02/05/1996, 09/06/1996, 03/20/2000    HPV Quadrivalent Vaccine (GARDASIL) - HISTORICAL DATA 01/18/2007, 08/17/2007, 01/16/2012    Hepatitis A Vaccine, Ped/Adol 01/18/2007, 08/17/2007    Hepatitis B Vaccine Adolescent/Pediatric 1995, 1995, 03/14/1996, 01/18/2007    Hib Vaccine (HbOC) - HISTORICAL DATA 1995, 1995, 02/05/1996, 09/06/1996    IPV 1995, 1995, 02/05/1996, 09/06/1996, 01/19/2001    Influenza (IM) Preservative Free - HISTORICAL DATA 01/09/2013    Influenza Vaccine Quad Inj (Pf) 02/23/2023    MMR Vaccine 07/06/2020    MODERNA SARS-COV-2 VACCINE (12+) 08/14/2021, 09/11/2021    Meningococcal Conjugate Vaccine MCV4 (Menactra) 01/16/2012    Tdap Vaccine 01/18/2007, 07/06/2020    Varicella Vaccine Live 01/18/2007, 08/17/2007            Objective     Vital Signs: BP (!) 144/84 (BP Location: Left arm, Patient Position: Sitting, BP Cuff Size: Large adult)   Pulse 84   Temp 36.6 °C (97.8 °F) (Temporal)   Ht 1.689 m (5' 6.5\")   Wt 104 kg (229 lb)   SpO2 96% Body mass index is 36.41 kg/m².    General: Appears well and comfortable  Eyes: No scleral or conjunctival lesions  ENT: No apparent oral or nasal lesions  Head/Neck: No apparent scalp or neck lesions  Cardiovascular: Regular rate and rhythm; no pericardial rubs  Respiratory: Breathing quiet and unlabored; no rales or pleural rubs  Gastrointestinal: No apparent organomegaly or abdominal masses  Integumentary: Diffuse " patchy macular blanchable erythema across face, neck, and chest  Musculoskeletal: No significant joint tenderness, periarticular soft tissue swelling, warmth, erythema, or overt synovitis; no significant restriction in range of motion of joints examined  Neurologic: No focal sensory or motor deficits  Psychiatric: Mood and affect appropriate      LABORATORY RESULTS REVIEWED AND INTERPRETED BY ME:  Lab Results   Component Value Date/Time    CREACTPROT <0.30 09/14/2023 08:11 AM    SEDRATEWES 6 09/14/2023 08:11 AM     Lab Results   Component Value Date/Time    ANTIMITOCHO 3.5 09/14/2023 08:11 AM    FACTIN 11 09/14/2023 08:11 AM     Lab Results   Component Value Date/Time    MICROSOMALA 12 08/18/2023 05:32 AM    TSHULTRASEN 1.610 07/21/2023 12:13 PM    FREET4 1.19 07/21/2023 12:13 PM     Lab Results   Component Value Date/Time    FERRITIN 22.2 09/14/2023 08:11 AM    IRON 84 09/14/2023 08:11 AM    PROTHROMBTM 12.8 05/21/2023 03:06 PM    INR 0.97 05/21/2023 03:06 PM     Lab Results   Component Value Date/Time    WBC 7.5 08/30/2023 11:03 AM    RBC 5.14 08/30/2023 11:03 AM    HEMOGLOBIN 15.5 08/30/2023 11:03 AM    HEMATOCRIT 46.6 08/30/2023 11:03 AM    MCV 90.7 08/30/2023 11:03 AM    MCH 30.2 08/30/2023 11:03 AM    MCHC 33.3 08/30/2023 11:03 AM    RDW 42.6 08/30/2023 11:03 AM    PLATELETCT 354 08/30/2023 11:03 AM    MPV 9.8 08/30/2023 11:03 AM    NEUTS 4.82 08/30/2023 11:03 AM    NEUTS 3.6 08/18/2023 05:32 AM    LYMPHOCYTES 24.30 08/30/2023 11:03 AM    MONOCYTES 9.40 08/30/2023 11:03 AM    EOSINOPHILS 0.80 08/30/2023 11:03 AM    BASOPHILS 0.50 08/30/2023 11:03 AM     Lab Results   Component Value Date/Time    ASTSGOT 15 09/14/2023 08:11 AM    ALTSGPT 18 09/14/2023 08:11 AM    ALKPHOSPHAT 116 (H) 09/14/2023 08:11 AM    TBILIRUBIN 0.3 09/14/2023 08:11 AM    TOTPROTEIN 7.2 09/14/2023 08:11 AM    ALBUMIN 4.1 09/14/2023 08:11 AM     Lab Results   Component Value Date/Time    SODIUM 138 08/30/2023 11:03 AM    POTASSIUM 4.4  08/30/2023 11:03 AM    CHLORIDE 101 08/30/2023 11:03 AM    CO2 26 08/30/2023 11:03 AM    GLUCOSE 99 08/30/2023 11:03 AM    BUN 10 08/30/2023 11:03 AM    CREATININE 0.76 08/30/2023 11:03 AM    CREATININE 0.6 03/14/2005 04:50 AM    GLOMRATE 109 05/20/2023 01:38 PM    CALCIUM 9.1 08/30/2023 11:03 AM     Lab Results   Component Value Date/Time    COLORURINE Red (A) 08/30/2023 12:41 PM    SPECGRAVITY 1.011 08/30/2023 12:41 PM    PHURINE 5.0 08/30/2023 12:41 PM    GLUCOSEUR Negative 08/30/2023 12:41 PM    KETONES Negative 08/30/2023 12:41 PM    PROTEINURIN 100 (A) 08/30/2023 12:41 PM     Lab Results   Component Value Date/Time    CHOLSTRLTOT 166 08/18/2023 05:32 AM    HDL 39 (L) 08/18/2023 05:32 AM    TRIGLYCERIDE 70 08/18/2023 05:32 AM    HBA1C 5.9 (H) 07/21/2023 12:13 PM       RADIOLOGY RESULTS REVIEWED AND INTERPRETED BY ME:  Results for orders placed during the hospital encounter of 03/13/05    DX-PELVIS-1 OR 2 VIEWS    Impression  IMPRESSION:    NORMAL AP VIEW OF THE PELVIS.    Results for orders placed during the hospital encounter of 03/13/05    CT-LSPINE W/O PLUS RECONS    Impression  IMPRESSION:    1.    NORMAL CT SCAN OF THE LUMBOSACRAL SPINE.    Results for orders placed during the hospital encounter of 03/13/05    CT-TSPINE W/O PLUS RECONS    Impression  IMPRESSION:    1. NO FRACTURE OR TRAUMATIC SUBLUXATION.      All relevant laboratory and imaging results reported on this note were reviewed and interpreted by me.         Assessment & Plan     Alexia Brown is a 28 y.o. female with history and physical as noted above whose presentation merits the following clinical impressions and recommendations:    1. Serologic autoimmunity (positive HÉCTOR 1:640 nuclear dot and 1:160 speckled)  Serologic evidence of immunologic activity without definitive diagnostic criteria-based clinical evidence of any underlying HÉCTOR-associated autoimmune disease, such as Sjogren syndrome, systemic lupus, inflammatory myopathy, or  systemic sclerosis. Notably, the HÉCTOR test is highly sensitive and lacks diagnostic specificity as it can be seen in a variety of non-rheumatic conditions, such as acute or chronic bacterial or viral infections (presumably via molecular mimicry), autoimmune thyroid disease (Hashimoto's thyroiditis or Graves' disease), autoimmune hepatobiliary disease, inflammatory bowel disease, and lymphoproliferative disease or malignancy, as well as in over 10% of healthy individuals with no clinical evidence of autoimmune rheumatic disease. In any case, it must be interpreted in the context of the overall clinical picture with close attention to disease-specific manifestations and disease-specific antibody subtypes. That said, the presence of persistently positive HÉCTOR, especially in high or rising titers, does confer some risk of HÉCTOR-associated disease, so if truly inflammatory symptoms develop and persist, clinical follow-up for re-evaluation would be reasonable. In this case, need to undertake the additional workup noted below for exclusionary, confirmatory, and risk stratification purposes.  - No indication for immunomodulatory therapy    2. Positive test for Donavon-Barr virus (EBV)  Positive IgG anti-EBV with negative IgM indicative of past infection that resolved which, in addition to COVID-19 infection, may have precipitated her serologic autoimmunity described above.  - No indication for antiviral therapy    3. Rash and nonspecific skin eruption  Clinically appears to be a hypersensitivity phenomenon with differentials including chronic idiopathic/spontaneous urticaria, and less likely hypocomplementemic urticarial vasculitis.  - IGE+ALLERGENS ZONE 15(26)  - COMPLEMENT C1Q, QUANTITATIVE  - C1 ESTERASE INHIBI; Future      The above assessment and plan were discussed with the patient who acknowledged understanding of the plan.    FOLLOW-UP: Return for follow-up TBD.         Thank you for the opportunity to participate in  the care of Alexia Brown.    Suleman Granda MD, MS, FACR  Rheumatologist, [facility] ? [facility]   of Clinical Medicine, Department of Internal Medicine  [facility] ? [facility]

## 2024-04-18 ENCOUNTER — APPOINTMENT (OUTPATIENT)
Dept: SLEEP MEDICINE | Facility: MEDICAL CENTER | Age: 29
End: 2024-04-18
Attending: STUDENT IN AN ORGANIZED HEALTH CARE EDUCATION/TRAINING PROGRAM

## 2024-05-03 ENCOUNTER — OFFICE VISIT (OUTPATIENT)
Dept: URGENT CARE | Facility: CLINIC | Age: 29
End: 2024-05-03
Payer: COMMERCIAL

## 2024-05-03 ENCOUNTER — APPOINTMENT (OUTPATIENT)
Dept: RADIOLOGY | Facility: IMAGING CENTER | Age: 29
End: 2024-05-03
Attending: PHYSICIAN ASSISTANT
Payer: COMMERCIAL

## 2024-05-03 VITALS
DIASTOLIC BLOOD PRESSURE: 90 MMHG | OXYGEN SATURATION: 92 % | HEART RATE: 86 BPM | RESPIRATION RATE: 16 BRPM | SYSTOLIC BLOOD PRESSURE: 142 MMHG | TEMPERATURE: 97.4 F | HEIGHT: 66 IN | WEIGHT: 226.8 LBS | BODY MASS INDEX: 36.45 KG/M2

## 2024-05-03 DIAGNOSIS — I10 PRIMARY HYPERTENSION: ICD-10-CM

## 2024-05-03 DIAGNOSIS — J01.90 ACUTE BACTERIAL SINUSITIS: ICD-10-CM

## 2024-05-03 DIAGNOSIS — B96.89 ACUTE BACTERIAL SINUSITIS: ICD-10-CM

## 2024-05-03 DIAGNOSIS — J98.01 BRONCHOSPASM: ICD-10-CM

## 2024-05-03 DIAGNOSIS — R05.1 ACUTE COUGH: ICD-10-CM

## 2024-05-03 PROCEDURE — 94640 AIRWAY INHALATION TREATMENT: CPT | Performed by: PHYSICIAN ASSISTANT

## 2024-05-03 PROCEDURE — 3080F DIAST BP >= 90 MM HG: CPT | Performed by: PHYSICIAN ASSISTANT

## 2024-05-03 PROCEDURE — 71046 X-RAY EXAM CHEST 2 VIEWS: CPT | Mod: TC | Performed by: RADIOLOGY

## 2024-05-03 PROCEDURE — 3077F SYST BP >= 140 MM HG: CPT | Performed by: PHYSICIAN ASSISTANT

## 2024-05-03 PROCEDURE — 99214 OFFICE O/P EST MOD 30 MIN: CPT | Mod: 25 | Performed by: PHYSICIAN ASSISTANT

## 2024-05-03 RX ORDER — DEXTROMETHORPHAN HYDROBROMIDE AND PROMETHAZINE HYDROCHLORIDE 15; 6.25 MG/5ML; MG/5ML
5 SYRUP ORAL EVERY 6 HOURS PRN
Qty: 118 ML | Refills: 0 | Status: SHIPPED | OUTPATIENT
Start: 2024-05-03 | End: 2024-05-10

## 2024-05-03 RX ORDER — BENZONATATE 100 MG/1
100 CAPSULE ORAL 3 TIMES DAILY PRN
Qty: 30 CAPSULE | Refills: 0 | Status: SHIPPED | OUTPATIENT
Start: 2024-05-03 | End: 2024-05-13

## 2024-05-03 RX ORDER — ALBUTEROL SULFATE 90 UG/1
2 AEROSOL, METERED RESPIRATORY (INHALATION) EVERY 6 HOURS PRN
Qty: 8.5 G | Refills: 0 | Status: SHIPPED | OUTPATIENT
Start: 2024-05-03

## 2024-05-03 RX ORDER — LISINOPRIL 40 MG/1
40 TABLET ORAL DAILY
Qty: 30 TABLET | Refills: 0 | Status: SHIPPED | OUTPATIENT
Start: 2024-05-03

## 2024-05-03 RX ORDER — IPRATROPIUM BROMIDE AND ALBUTEROL SULFATE 2.5; .5 MG/3ML; MG/3ML
3 SOLUTION RESPIRATORY (INHALATION) ONCE
Status: COMPLETED | OUTPATIENT
Start: 2024-05-03 | End: 2024-05-03

## 2024-05-03 RX ORDER — PREDNISONE 10 MG/1
40 TABLET ORAL DAILY
Qty: 20 TABLET | Refills: 0 | Status: SHIPPED | OUTPATIENT
Start: 2024-05-03 | End: 2024-05-08

## 2024-05-03 RX ORDER — DOXYCYCLINE HYCLATE 100 MG
100 TABLET ORAL 2 TIMES DAILY
Qty: 14 TABLET | Refills: 0 | Status: SHIPPED | OUTPATIENT
Start: 2024-05-03 | End: 2024-05-10

## 2024-05-03 RX ADMIN — IPRATROPIUM BROMIDE AND ALBUTEROL SULFATE 3 ML: 2.5; .5 SOLUTION RESPIRATORY (INHALATION) at 09:17

## 2024-05-03 ASSESSMENT — ENCOUNTER SYMPTOMS
VOMITING: 0
FEVER: 0
SORE THROAT: 1
ABDOMINAL PAIN: 0
CONSTIPATION: 0
EYE PAIN: 0
CHILLS: 0
SPUTUM PRODUCTION: 1
COUGH: 1
NAUSEA: 0
MYALGIAS: 0
HEADACHES: 0
SHORTNESS OF BREATH: 1
DIARRHEA: 0
SINUS PAIN: 1

## 2024-05-03 ASSESSMENT — FIBROSIS 4 INDEX: FIB4 SCORE: 0.28

## 2024-05-03 NOTE — PROGRESS NOTES
"Subjective:   Alexia Brown is a 28 y.o. female who presents for URI (Nasal congestion, cough, wheezing x 2 weeks ) and Other (Rx lisinopril refill )      Is a 28-year-old female who has a 2 to 3-week history of symptoms, seen for URI recently however symptoms have been worsening with more sinus pain and pressure, exacerbation of her asthma.  She has been using her albuterol and it does not seem to be as effective.  She noticed more congestion and sinus pain and pressure as well as coughing.  She has had minimal relief from over-the-counter cough suppressants.  She feels quite tired from poor sleep quality and coughing but no fevers or overt body aches    Review of Systems   Constitutional:  Positive for malaise/fatigue. Negative for chills and fever.   HENT:  Positive for congestion, sinus pain and sore throat. Negative for ear pain.    Eyes:  Negative for pain.   Respiratory:  Positive for cough, sputum production and shortness of breath.    Cardiovascular:  Negative for chest pain.   Gastrointestinal:  Negative for abdominal pain, constipation, diarrhea, nausea and vomiting.   Genitourinary:  Negative for dysuria.   Musculoskeletal:  Negative for myalgias.   Skin:  Negative for rash.   Neurological:  Negative for headaches.       Medications, Allergies, and current problem list reviewed today in Epic.     Objective:     BP (!) 142/90 (BP Location: Left arm, Patient Position: Sitting, BP Cuff Size: Adult)   Pulse 86   Temp 36.3 °C (97.4 °F) (Temporal)   Resp 16   Ht 1.676 m (5' 6\")   Wt 103 kg (226 lb 12.8 oz)   SpO2 92%     Physical Exam  Vitals reviewed.   Constitutional:       Appearance: Normal appearance.   HENT:      Head: Normocephalic and atraumatic.      Right Ear: Tympanic membrane, ear canal and external ear normal.      Left Ear: Tympanic membrane, ear canal and external ear normal.      Nose: Congestion and rhinorrhea present.      Mouth/Throat:      Mouth: Mucous membranes are moist.     "  Pharynx: No oropharyngeal exudate or posterior oropharyngeal erythema.      Comments: POST NASAL DRIP  Eyes:      Conjunctiva/sclera: Conjunctivae normal.   Cardiovascular:      Rate and Rhythm: Normal rate and regular rhythm.   Pulmonary:      Effort: Pulmonary effort is normal.      Comments: Diminished.  Increased vesicular breath sounds without wheezing or rails or rhonchi after nebulizer treatment  Musculoskeletal:      Cervical back: Normal range of motion.   Lymphadenopathy:      Cervical: No cervical adenopathy.   Skin:     General: Skin is warm and dry.      Capillary Refill: Capillary refill takes less than 2 seconds.   Neurological:      Mental Status: She is alert and oriented to person, place, and time.         RADIOLOGY RESULTS   DX-CHEST-2 VIEWS    Result Date: 5/3/2024  5/3/2024 8:58 AM HISTORY/REASON FOR EXAM: Cough and shortness of breath for 2 weeks. TECHNIQUE/EXAM DESCRIPTION AND NUMBER OF VIEWS: Two views of the chest. COMPARISON: None. FINDINGS: There is no evidence of focal consolidation or evidence of pulmonary edema. The heart is normal in size. There is no evidence of pleural effusion. There are surgical changes involving the cervical spine. There is mild pectus deformity.     No evidence of acute cardiopulmonary process.           Assessment/Plan:     Diagnosis and associated orders:     1. Bronchospasm  ipratropium-albuterol (DUONEB) nebulizer solution    albuterol 108 (90 Base) MCG/ACT Aero Soln inhalation aerosol    predniSONE (DELTASONE) 10 MG Tab      2. Acute cough  DX-CHEST-2 VIEWS    benzonatate (TESSALON) 100 MG Cap    promethazine-dextromethorphan (PROMETHAZINE-DM) 6.25-15 MG/5ML syrup      3. Primary hypertension  lisinopril (PRINIVIL) 40 MG tablet      4. Acute bacterial sinusitis  doxycycline (VIBRAMYCIN) 100 MG Tab         Comments/MDM:     Patient with mild subjective improvement and increase objective vesicular breath sounds after DuoNeb treatment.  No evidence of  pulmonary consolidation but given her worsening as well as sinus symptoms consistent with acute bacterial rhinosinusitis.  Will put on a course of prednisone, continue Tessalon.  Patient warned about sedating potential from the promethazine with dextromethorphan.  She ran out of her lisinopril yesterday and does not see her primary to the end of the month so I extended her lisinopril as a courtesy for 30 days and we will start her on doxycycline as she was on amoxicillin from a [facility] recently and only noted some mild improvement         Differential diagnosis, natural history, supportive care, and indications for immediate follow-up discussed.    Advised the patient to follow-up with the primary care physician for recheck, reevaluation, and consideration of further management.    Please note that this dictation was created using voice recognition software. I have made a reasonable attempt to correct obvious errors, but I expect that there are errors of grammar and possibly content that I did not discover before finalizing the note.    This note was electronically signed by Jett Joseph PA-C

## 2024-05-03 NOTE — LETTER
May 3, 2024    To Whom It May Concern:         This is confirmation that Alexia Brown attended her scheduled appointment with Jett Joseph P.A.-C. on 5/03/24.         If you have any questions please do not hesitate to call me at the phone number listed below.    Sincerely,          Jett Joseph P.A.-C.  [phone]

## 2024-05-20 ENCOUNTER — APPOINTMENT (OUTPATIENT)
Dept: MEDICAL GROUP | Facility: IMAGING CENTER | Age: 29
End: 2024-05-20
Payer: COMMERCIAL

## 2024-07-23 PROBLEM — O09.891 SUPERVISION OF OTHER HIGH RISK PREGNANCIES, FIRST TRIMESTER: Status: ACTIVE | Noted: 2024-07-23

## 2024-07-25 PROBLEM — Z67.91 RH NEGATIVE, ANTEPARTUM: Status: ACTIVE | Noted: 2024-07-25

## 2024-07-25 PROBLEM — O26.899 RH NEGATIVE, ANTEPARTUM: Status: ACTIVE | Noted: 2024-07-25

## 2024-08-22 ENCOUNTER — OFFICE VISIT (OUTPATIENT)
Dept: URGENT CARE | Facility: CLINIC | Age: 29
End: 2024-08-22
Payer: COMMERCIAL

## 2024-08-22 VITALS
OXYGEN SATURATION: 94 % | TEMPERATURE: 98 F | WEIGHT: 229.9 LBS | HEIGHT: 66 IN | BODY MASS INDEX: 36.95 KG/M2 | HEART RATE: 92 BPM | SYSTOLIC BLOOD PRESSURE: 124 MMHG | RESPIRATION RATE: 16 BRPM | DIASTOLIC BLOOD PRESSURE: 80 MMHG

## 2024-08-22 DIAGNOSIS — J02.9 PHARYNGITIS, UNSPECIFIED ETIOLOGY: Primary | ICD-10-CM

## 2024-08-22 LAB
FLUAV RNA SPEC QL NAA+PROBE: NEGATIVE
FLUBV RNA SPEC QL NAA+PROBE: NEGATIVE
RSV RNA SPEC QL NAA+PROBE: NEGATIVE
S PYO DNA SPEC NAA+PROBE: NOT DETECTED
SARS-COV-2 RNA RESP QL NAA+PROBE: NEGATIVE

## 2024-08-22 PROCEDURE — 3079F DIAST BP 80-89 MM HG: CPT

## 2024-08-22 PROCEDURE — 3074F SYST BP LT 130 MM HG: CPT

## 2024-08-22 PROCEDURE — 87651 STREP A DNA AMP PROBE: CPT

## 2024-08-22 PROCEDURE — 99212 OFFICE O/P EST SF 10 MIN: CPT

## 2024-08-22 PROCEDURE — 0241U POCT CEPHEID COV-2, FLU A/B, RSV - PCR: CPT

## 2024-08-22 ASSESSMENT — ENCOUNTER SYMPTOMS
COUGH: 0
EYE PAIN: 0
PALPITATIONS: 0
NAUSEA: 0
ABDOMINAL PAIN: 0
SHORTNESS OF BREATH: 0
STRIDOR: 0
BLURRED VISION: 0
WHEEZING: 0
BRUISES/BLEEDS EASILY: 0
CHILLS: 0
HEARTBURN: 0
SPUTUM PRODUCTION: 0
DIAPHORESIS: 0
SORE THROAT: 0
MYALGIAS: 0
FEVER: 0
EYE REDNESS: 0
EYE DISCHARGE: 0
DEPRESSION: 0
DIARRHEA: 0
FOCAL WEAKNESS: 0
VOMITING: 0
PHOTOPHOBIA: 0
DIZZINESS: 0
HEADACHES: 0
HEMOPTYSIS: 0
DOUBLE VISION: 0

## 2024-08-22 ASSESSMENT — FIBROSIS 4 INDEX: FIB4 SCORE: 0.45

## 2024-08-22 NOTE — LETTER
August 22, 2024    To Whom It May Concern:         This is confirmation that Alexia Brown attended her scheduled appointment with ANIKET Espinoza on 8/22/24. They are medically excused form work due to illness from 08/22/2024 through 08/24/2024.  They may return to work on 8/25/2024.           If you have any questions please do not hesitate to call me at the phone number listed below.    Sincerely,          ERICK Espinoza.  [phone]

## 2024-08-22 NOTE — PROGRESS NOTES
Subjective:   Alexia Brown is a 29 y.o. female who presents for Cough (Fever, sore throat, headache, runny nose, congestion x yesterday, pt states has had covid exposure, 13 week pregnant )          I introduced myself to the patient and informed them that I am a Family Nurse Practitioner.    HPI:Alexia is a 29 year-old female who comes in today c/o fever, chills, cough, nasal congestion, runny nose, malaise, body aches,  diarrhea,  pharyngitis.  Onset was 1 days ago. Patient describes symptoms as constant. They describe the pain as headache, body aches,  sharp and scratchy throat. Aggravating factors include worse at night, cough is worse when they lay down, throat pain is exacerbated by eating, drinking, swallowing. Relieving factors include none. Treatments tried at home include  OTC Cold and Flu medicine with poor effect.  They describe their symptoms as moderate. Denies any known exposure to Covid, Flu, RSV, strep. Denies anyone else is sick at home presently. States they did not get a flu shot this season, states vaccinated against Covid x 2. She states she has been exposed to Covid in the last few days by co-workers.       Review of Systems   Constitutional:  Negative for chills, diaphoresis, fever and malaise/fatigue.   HENT:  Negative for congestion, ear discharge, ear pain, hearing loss, sore throat and tinnitus.    Eyes:  Negative for blurred vision, double vision, photophobia, pain, discharge and redness.   Respiratory:  Negative for cough, hemoptysis, sputum production, shortness of breath, wheezing and stridor.    Cardiovascular:  Negative for chest pain, palpitations and leg swelling.   Gastrointestinal:  Negative for abdominal pain, diarrhea, heartburn, nausea and vomiting.   Genitourinary:  Negative for dysuria.   Musculoskeletal:  Negative for myalgias.   Skin:  Negative for rash.   Neurological:  Negative for dizziness, focal weakness and headaches.   Endo/Heme/Allergies:  Does not  "bruise/bleed easily.   Psychiatric/Behavioral:  Negative for depression.    All other systems reviewed and are negative.      Medications: albuterol Aers  aspirin  labetalol Tabs     Allergies: Patient has no known allergies.    Problem List: does not have any pertinent problems on file.    Surgical History:  Past Surgical History:   Procedure Laterality Date    CERVICAL DISK AND FUSION ANTERIOR      LAPAROSCOPY      abdomen        Past Social Hx:   reports that she quit smoking about 2 years ago. Her smoking use included cigarettes. She has never used smokeless tobacco. She reports that she does not currently use alcohol. She reports that she does not currently use drugs after having used the following drugs: Oral.     Past Family Hx:   family history includes Alcohol abuse in her father and mother; Breast Cancer in her maternal grandmother; Cancer in her maternal aunt and maternal grandmother; Diabetes in her father; Drug abuse in her father and mother; Hypertension in her sister; Lupus in her mother and another family member; Thyroid in her father.     Problem list, medications, and allergies reviewed by myself today in Epic.   I have documented what I find to be significant in regards to past medical, social, family and surgical history  in my HPI or under PMH/PSH/FH review section, otherwise it is noncontributory     Objective:     /80 (BP Location: Right arm, Patient Position: Sitting, BP Cuff Size: Adult long)   Pulse 92   Temp 36.7 °C (98 °F) (Temporal)   Resp 16   Ht 1.676 m (5' 6\")   Wt 104 kg (229 lb 14.4 oz)   LMP 05/20/2024   SpO2 94%   BMI 37.11 kg/m²     During this visit, appropriate PPE was worn, and hand hygiene was performed.    Physical Exam  Vitals reviewed.   Constitutional:       General: She is not in acute distress.     Appearance: Normal appearance. She is not toxic-appearing or diaphoretic.   HENT:      Head: Normocephalic and atraumatic.      Right Ear: Tympanic membrane, " ear canal and external ear normal. There is no impacted cerumen.      Left Ear: Tympanic membrane, ear canal and external ear normal. There is no impacted cerumen.      Nose: Congestion and rhinorrhea present.      Mouth/Throat:      Mouth: Mucous membranes are moist.      Pharynx: Posterior oropharyngeal erythema present. No oropharyngeal exudate.      Comments: No tonsillar swelling, bilaterally. There is posterior oropharyngeal erythema present, no exudates or cobblestoning.  No soft tissue swelling of the sublingual mucosa, no petechia or swelling of the soft or hard palate, no unilarteral oropharynx swelling, no sign of tonsillar stone, epiglottitis, or abscess.  Airway is patent and there is no stridor.  Patient is managing oral secretions appropriately.  Uvula is midline and appropriate size with no erythema or edema.    Eyes:      General: No scleral icterus.        Right eye: No discharge.         Left eye: No discharge.      Extraocular Movements: Extraocular movements intact.      Conjunctiva/sclera: Conjunctivae normal.      Pupils: Pupils are equal, round, and reactive to light.   Cardiovascular:      Rate and Rhythm: Normal rate and regular rhythm.      Heart sounds: Normal heart sounds. No murmur heard.     No friction rub. No gallop.   Pulmonary:      Effort: No respiratory distress.      Breath sounds: Normal breath sounds. No stridor. No wheezing, rhonchi or rales.   Chest:      Chest wall: No tenderness.   Abdominal:      General: Bowel sounds are normal. There is no distension.      Palpations: Abdomen is soft.   Genitourinary:     Comments: Deferred  Musculoskeletal:         General: Normal range of motion.      Cervical back: Normal range of motion. No rigidity or tenderness.   Lymphadenopathy:      Cervical: No cervical adenopathy.   Skin:     General: Skin is warm and dry.      Capillary Refill: Capillary refill takes 2 to 3 seconds.      Coloration: Skin is not jaundiced or pale.    Neurological:      General: No focal deficit present.      Mental Status: She is alert and oriented to person, place, and time. Mental status is at baseline.   Psychiatric:         Mood and Affect: Mood normal.         Behavior: Behavior normal.         Thought Content: Thought content normal.         Judgment: Judgment normal.         Lab Results/POC Test Results   Results for orders placed or performed in visit on 08/22/24   POCT Cepheid CoV-2, Flu A/B, RSV - PCR   Result Value Ref Range    SARS-CoV-2 by PCR Negative Negative, Invalid    Influenza virus A RNA Negative Negative, Invalid    Influenza virus B, PCR Negative Negative, Invalid    RSV, PCR Negative Negative, Invalid   POCT CEPHEID GROUP A STREP - PCR   Result Value Ref Range    POC Group A Strep, PCR Not Detected Not Detected, Invalid           Assessment/Plan:     Diagnosis and associated orders:     1. Pharyngitis, unspecified etiology  POCT Cepheid CoV-2, Flu A/B, RSV - PCR    POCT CEPHEID GROUP A STREP - PCR         Comments/MDM:     1. Pharyngitis, unspecified etiology  I discussed with patient I will call them only if PCR testing in clinic today is positive and we need to discuss further treatment otherwise treatment will be the supportive care measures we discussed in clinic today.  Results will be available on [employer] if they have this set up.  They state they understand and are agreeable.  We discussed viral versus bacterial infection, and I informed patient that if the strep PCR is negative then they likely have a self-limiting viral pharyngitis at this time and antibiotics are not an effective treatment for this.    I instructed them that the management for this is supportive care-we discussed cough/deep breathing exercises, drink plenty of fluids, get plenty of rest, try a humidifier in bedroom at night to help keep mucous membranes moist during sleep, gargle with salt water/honey/OTC Cepacol lozenges to help ease sore throat.    may take  tylenol 1000mg every 6 hours, do not exceed 3000 mg in 24 hours; avoid NSAIDs and OTC medications due to pregnancy  Patient was instructed that they should feel better in 7-10 days, (but some viral illnesses can last 2 weeks or longer, with residual cough possible for over a month) but to return to clinic if symptoms do not improve or worsen after 10-14 days.   We did discuss red flags and when to return to urgent care versus when to go to the emergency room and strict ER precautions were given.    I did print written instructions for patient, and did go over the diagnosis including differentials, and side effects of each medication with them and answer their questions to the best of my ability.   Advised the patient to follow-up with the primary care physician for recheck, reevaluation, and consideration of further management.  Strict ER precautions discussed for any  chest pain, difficulty breathing, difficulty swallowing, wheezing, stridor, or drooling, fever that does not respond to ibuprofen and Tylenol, inability to tolerate fluids, dehydration, lethargy.    Patient states they have good understanding and they are agreeable with the plan of care.     - POCT Cepheid CoV-2, Flu A/B, RSV - PCR  - POCT CEPHEID GROUP A STREP - PCR         Pt is clinically stable at today's acute urgent care visit. Vital signs are normal and reassuring.  No acute distress noted. Appropriate for outpatient management at this time.        I personally reviewed prior external notes and test results pertinent to today's visit.  I have independently reviewed and interpreted all diagnostics ordered during this urgent care acute visit.        Please note that this dictation was created using voice recognition software. I have made a reasonable attempt to correct obvious errors, but I expect that there are errors of grammar and possibly content that I did not discover before finalizing the note.    This note was electronically signed by  Carlitos HIGGINBOTHAM, FNP-BC, CWS, CFCN

## 2024-11-12 ENCOUNTER — ANCILLARY PROCEDURE (OUTPATIENT)
Dept: MATERNAL FETAL MEDICINE | Facility: MEDICAL CENTER | Age: 29
End: 2024-11-12
Payer: COMMERCIAL

## 2024-11-12 VITALS
BODY MASS INDEX: 38.98 KG/M2 | HEART RATE: 85 BPM | DIASTOLIC BLOOD PRESSURE: 74 MMHG | SYSTOLIC BLOOD PRESSURE: 128 MMHG | WEIGHT: 241.5 LBS

## 2024-11-12 DIAGNOSIS — O16.2 HYPERTENSION AFFECTING PREGNANCY IN SECOND TRIMESTER: ICD-10-CM

## 2024-11-12 DIAGNOSIS — O44.00 COMPLETE PLACENTA PREVIA NOS OR WITHOUT HEMORRHAGE, UNSPECIFIED TRIMESTER: ICD-10-CM

## 2024-11-12 DIAGNOSIS — Z3A.25 25 WEEKS GESTATION OF PREGNANCY: ICD-10-CM

## 2024-11-12 DIAGNOSIS — Z03.72 PLACENTAL PROBLEM SUSPECTED BUT NOT FOUND: ICD-10-CM

## 2024-11-12 PROCEDURE — 76811 OB US DETAILED SNGL FETUS: CPT | Performed by: OBSTETRICS & GYNECOLOGY

## 2024-11-12 PROCEDURE — 76817 TRANSVAGINAL US OBSTETRIC: CPT | Performed by: OBSTETRICS & GYNECOLOGY

## 2024-11-12 ASSESSMENT — FIBROSIS 4 INDEX: FIB4 SCORE: 0.45

## 2024-11-12 NOTE — LETTER
November 12, 2024    To Whom It May Concern:         This is confirmation that Alexia Brown attended her scheduled appointment with Raul Gonzales M.D., Maternal Fetal Medicine on 11/12/24.         If you have any questions please do not hesitate to call me at the phone number listed below.    Sincerely,          Yara Jason, Med Ass't  [phone]

## 2024-12-08 ENCOUNTER — OFFICE VISIT (OUTPATIENT)
Dept: URGENT CARE | Facility: CLINIC | Age: 29
End: 2024-12-08
Payer: COMMERCIAL

## 2024-12-08 VITALS
SYSTOLIC BLOOD PRESSURE: 122 MMHG | HEART RATE: 101 BPM | RESPIRATION RATE: 16 BRPM | DIASTOLIC BLOOD PRESSURE: 76 MMHG | HEIGHT: 66 IN | BODY MASS INDEX: 36.8 KG/M2 | TEMPERATURE: 97.8 F | OXYGEN SATURATION: 96 % | WEIGHT: 229 LBS

## 2024-12-08 DIAGNOSIS — J45.901 ASTHMA WITH ACUTE EXACERBATION, UNSPECIFIED ASTHMA SEVERITY, UNSPECIFIED WHETHER PERSISTENT: ICD-10-CM

## 2024-12-08 DIAGNOSIS — Z3A.29 29 WEEKS GESTATION OF PREGNANCY: ICD-10-CM

## 2024-12-08 DIAGNOSIS — J98.8 RTI (RESPIRATORY TRACT INFECTION): ICD-10-CM

## 2024-12-08 PROCEDURE — 3078F DIAST BP <80 MM HG: CPT | Performed by: NURSE PRACTITIONER

## 2024-12-08 PROCEDURE — 99214 OFFICE O/P EST MOD 30 MIN: CPT | Performed by: NURSE PRACTITIONER

## 2024-12-08 PROCEDURE — 3074F SYST BP LT 130 MM HG: CPT | Performed by: NURSE PRACTITIONER

## 2024-12-08 RX ORDER — TRAMADOL HYDROCHLORIDE 50 MG/1
TABLET ORAL
COMMUNITY

## 2024-12-08 RX ORDER — PREDNISONE 20 MG/1
20 TABLET ORAL DAILY
Qty: 5 TABLET | Refills: 0 | Status: SHIPPED | OUTPATIENT
Start: 2024-12-08 | End: 2024-12-13

## 2024-12-08 RX ORDER — AZITHROMYCIN 250 MG/1
TABLET, FILM COATED ORAL
Qty: 6 TABLET | Refills: 0 | Status: SHIPPED | OUTPATIENT
Start: 2024-12-08 | End: 2024-12-13

## 2024-12-08 RX ORDER — BUPROPION HYDROCHLORIDE 100 MG/1
1 TABLET, EXTENDED RELEASE ORAL EVERY MORNING
COMMUNITY

## 2024-12-08 ASSESSMENT — ENCOUNTER SYMPTOMS
WHEEZING: 1
SHORTNESS OF BREATH: 1
SPUTUM PRODUCTION: 1
COUGH: 1
FEVER: 1

## 2024-12-08 ASSESSMENT — FIBROSIS 4 INDEX: FIB4 SCORE: 0.45

## 2024-12-08 ASSESSMENT — VISUAL ACUITY: OU: 1

## 2024-12-08 NOTE — PROGRESS NOTES
Subjective:     Alexia Brown is a 29 y.o. female who presents for Cough (Patient feeling congestion, patient stated she testing negative for COVID X 5 DAYS )       Cough  This is a new problem. Associated symptoms include a fever, shortness of breath and wheezing.     Ambient listening software ([id number]) used during this encounter with the consent of the patient. The following text is AI generated:    History of Present Illness  The patient presents for evaluation of cough and congestion.    Cough and Congestion  She has had worsening cough and congestion for 5 days. The cough began on Tuesday, and she sought medical attention on Thursday, where it was deemed likely viral. Despite taking Robitussin, symptoms have worsened. She is 29 weeks pregnant and producing green phlegm. She reports difficulty breathing and has a history of asthma, using an inhaler that is ineffective at night. She tested negative for COVID-19. She experiences wheezing, especially at night and in the morning, and has a sore throat. She had a fever on Friday and possibly yesterday. Symptoms worsen upon waking.  - Onset: Cough began on Tuesday, worsening over 5 days.  - Location: Respiratory system (cough, congestion, wheezing, sore throat).  - Duration: 5 days.  - Character: Worsening cough, congestion, green phlegm, difficulty breathing, wheezing, sore throat.  - Alleviating/Aggravating Factors: Robitussin taken but symptoms worsened; inhaler ineffective at night; symptoms worsen upon waking.  - Timing: Wheezing especially at night and in the morning; fever on Friday and possibly yesterday.  - Severity: Worsening symptoms despite medication; difficulty breathing; history of asthma.    Review of Systems   Constitutional:  Positive for fever.   HENT:  Positive for congestion.    Respiratory:  Positive for cough, sputum production, shortness of breath and wheezing.    All other systems reviewed and are negative.    Refer to HPI for  "additional details.    During this visit, appropriate PPE was worn, and hand hygiene was performed.    PMH:  has a past medical history of Depression, Hypertension, and Neck pain.    MEDS:   Current Outpatient Medications:     traMADol (ULTRAM) 50 MG Tab, 1 tablet as needed Orally bid for 30 days, Disp: , Rfl:     buPROPion SR (WELLBUTRIN SR) 100 MG TABLET SR 12 HR, Take 1 Tablet by mouth every morning., Disp: , Rfl:     predniSONE (DELTASONE) 20 MG Tab, Take 1 Tablet by mouth every day for 5 days., Disp: 5 Tablet, Rfl: 0    azithromycin (ZITHROMAX) 250 MG Tab, Take 2 tabs by mouth once today, then one tab by mouth once daily days 2-5., Disp: 6 Tablet, Rfl: 0    labetalol (NORMODYNE) 200 MG Tab, Take 1 Tablet by mouth 2 times a day., Disp: 60 Tablet, Rfl: 6    aspirin 81 MG EC tablet, Take 2 Tablets by mouth every day., Disp: 180 Tablet, Rfl: 9    albuterol 108 (90 Base) MCG/ACT Aero Soln inhalation aerosol, Inhale 2 Puffs every 6 hours as needed for Shortness of Breath., Disp: 8.5 g, Rfl: 0    albuterol 108 (90 Base) MCG/ACT Aero Soln inhalation aerosol, Inhale., Disp: , Rfl:     ALLERGIES:   Allergies   Allergen Reactions    Morphine      Other Reaction(s): low blood pressure     SURGHX:   Past Surgical History:   Procedure Laterality Date    CERVICAL DISK AND FUSION ANTERIOR      LAPAROSCOPY      abdomen      SOCHX:  reports that she quit smoking about 2 years ago. Her smoking use included cigarettes. She has never used smokeless tobacco. She reports that she does not currently use alcohol. She reports that she does not currently use drugs after having used the following drugs: Oral.    FH: Per HPI as applicable/pertinent.      Objective:     /76   Pulse (!) 101   Temp 36.6 °C (97.8 °F)   Resp 16   Ht 1.676 m (5' 6\")   Wt 104 kg (229 lb)   LMP 05/20/2024   SpO2 96%   BMI 36.96 kg/m²     Physical Exam  Nursing note reviewed.   Constitutional:       General: She is not in acute distress.     " Appearance: She is well-developed. She is ill-appearing. She is not toxic-appearing.   HENT:      Nose: Nose normal.      Mouth/Throat:      Mouth: Mucous membranes are moist.      Pharynx: Posterior oropharyngeal erythema and postnasal drip present.   Eyes:      General: Vision grossly intact.      Extraocular Movements: Extraocular movements intact.      Conjunctiva/sclera: Conjunctivae normal.   Neck:      Trachea: Phonation normal.   Cardiovascular:      Rate and Rhythm: Regular rhythm. Tachycardia present.      Heart sounds: Normal heart sounds.   Pulmonary:      Effort: Pulmonary effort is normal. No respiratory distress.      Breath sounds: Normal breath sounds. No decreased breath sounds.   Musculoskeletal:         General: No deformity. Normal range of motion.      Cervical back: Normal range of motion.   Skin:     General: Skin is warm and dry.      Coloration: Skin is not pale.   Neurological:      Mental Status: She is alert and oriented to person, place, and time.      Motor: No weakness.   Psychiatric:         Behavior: Behavior normal. Behavior is cooperative.       Assessment/Plan:     1. Asthma with acute exacerbation, unspecified asthma severity, unspecified whether persistent  - predniSONE (DELTASONE) 20 MG Tab; Take 1 Tablet by mouth every day for 5 days.  Dispense: 5 Tablet; Refill: 0  - azithromycin (ZITHROMAX) 250 MG Tab; Take 2 tabs by mouth once today, then one tab by mouth once daily days 2-5.  Dispense: 6 Tablet; Refill: 0    2. RTI (respiratory tract infection)  - predniSONE (DELTASONE) 20 MG Tab; Take 1 Tablet by mouth every day for 5 days.  Dispense: 5 Tablet; Refill: 0  - azithromycin (ZITHROMAX) 250 MG Tab; Take 2 tabs by mouth once today, then one tab by mouth once daily days 2-5.  Dispense: 6 Tablet; Refill: 0    3. 29 weeks gestation of pregnancy  - predniSONE (DELTASONE) 20 MG Tab; Take 1 Tablet by mouth every day for 5 days.  Dispense: 5 Tablet; Refill: 0  - azithromycin  (ZITHROMAX) 250 MG Tab; Take 2 tabs by mouth once today, then one tab by mouth once daily days 2-5.  Dispense: 6 Tablet; Refill: 0    Emphasize supportive measures, rest, fluids, and symptom management with over-the-counter medication as needed such as guaifenesin and dextromethorphan. Rx as above sent electronically. Continue albuterol PRN.    Standard precautions/mask/wash hands.    Monitor. Warning signs reviewed. Return precautions advised.    Differential diagnosis, natural history, supportive care, over-the-counter symptom management per 's instructions, close monitoring, and indications for immediate follow-up discussed.     All questions answered. Patient agrees with the plan of care.    Discharge summary provided via [facility].    Billing note: chronic illness with exacerbation/progression; prescription drug management. Established patient. [id number]. Please refer to LOS tool for details.

## 2024-12-10 ENCOUNTER — ANCILLARY PROCEDURE (OUTPATIENT)
Dept: MATERNAL FETAL MEDICINE | Facility: MEDICAL CENTER | Age: 29
End: 2024-12-10
Attending: OBSTETRICS & GYNECOLOGY
Payer: COMMERCIAL

## 2024-12-10 VITALS
WEIGHT: 249.6 LBS | SYSTOLIC BLOOD PRESSURE: 149 MMHG | BODY MASS INDEX: 40.29 KG/M2 | HEART RATE: 83 BPM | DIASTOLIC BLOOD PRESSURE: 85 MMHG

## 2024-12-10 DIAGNOSIS — O16.3 HYPERTENSION AFFECTING PREGNANCY IN THIRD TRIMESTER: ICD-10-CM

## 2024-12-10 DIAGNOSIS — O35.BXX0 ANOMALY OF HEART OF FETUS AFFECTING PREGNANCY, ANTEPARTUM, SINGLE OR UNSPECIFIED FETUS: ICD-10-CM

## 2024-12-10 DIAGNOSIS — R03.0 ELEVATED BLOOD PRESSURE READING: ICD-10-CM

## 2024-12-10 DIAGNOSIS — O44.00 COMPLETE PLACENTA PREVIA NOS OR WITHOUT HEMORRHAGE, UNSPECIFIED TRIMESTER: ICD-10-CM

## 2024-12-10 DIAGNOSIS — Z3A.29 29 WEEKS GESTATION OF PREGNANCY: ICD-10-CM

## 2024-12-10 LAB
APPEARANCE UR: CLEAR
BILIRUB UR STRIP-MCNC: NEGATIVE MG/DL
COLOR UR AUTO: YELLOW
GLUCOSE UR STRIP.AUTO-MCNC: NEGATIVE MG/DL
KETONES UR STRIP.AUTO-MCNC: NEGATIVE MG/DL
LEUKOCYTE ESTERASE UR QL STRIP.AUTO: NEGATIVE
NITRITE UR QL STRIP.AUTO: NEGATIVE
PH UR STRIP.AUTO: 6.5 [PH] (ref 5–8)
PROT UR QL STRIP: NEGATIVE MG/DL
RBC UR QL AUTO: NEGATIVE
SP GR UR STRIP.AUTO: >=1.03
UROBILINOGEN UR STRIP-MCNC: NORMAL MG/DL

## 2024-12-10 PROCEDURE — 81002 URINALYSIS NONAUTO W/O SCOPE: CPT | Performed by: OBSTETRICS & GYNECOLOGY

## 2024-12-10 PROCEDURE — 99213 OFFICE O/P EST LOW 20 MIN: CPT | Performed by: OBSTETRICS & GYNECOLOGY

## 2024-12-10 PROCEDURE — 76816 OB US FOLLOW-UP PER FETUS: CPT | Performed by: OBSTETRICS & GYNECOLOGY

## 2024-12-10 ASSESSMENT — FIBROSIS 4 INDEX: FIB4 SCORE: 0.45

## 2025-01-07 ENCOUNTER — APPOINTMENT (OUTPATIENT)
Dept: MATERNAL FETAL MEDICINE | Facility: MEDICAL CENTER | Age: 30
End: 2025-01-07
Attending: OBSTETRICS & GYNECOLOGY
Payer: COMMERCIAL

## 2025-01-10 ENCOUNTER — ANCILLARY PROCEDURE (OUTPATIENT)
Dept: MATERNAL FETAL MEDICINE | Facility: MEDICAL CENTER | Age: 30
End: 2025-01-10
Attending: OBSTETRICS & GYNECOLOGY
Payer: COMMERCIAL

## 2025-01-10 VITALS
BODY MASS INDEX: 41.06 KG/M2 | WEIGHT: 254.4 LBS | SYSTOLIC BLOOD PRESSURE: 123 MMHG | HEART RATE: 79 BPM | DIASTOLIC BLOOD PRESSURE: 70 MMHG

## 2025-01-10 DIAGNOSIS — O44.00 COMPLETE PLACENTA PREVIA NOS OR WITHOUT HEMORRHAGE, UNSPECIFIED TRIMESTER: ICD-10-CM

## 2025-01-10 PROCEDURE — 76816 OB US FOLLOW-UP PER FETUS: CPT | Performed by: OBSTETRICS & GYNECOLOGY

## 2025-01-10 ASSESSMENT — FIBROSIS 4 INDEX: FIB4 SCORE: 0.56
